# Patient Record
Sex: FEMALE | Race: ASIAN | NOT HISPANIC OR LATINO
[De-identification: names, ages, dates, MRNs, and addresses within clinical notes are randomized per-mention and may not be internally consistent; named-entity substitution may affect disease eponyms.]

---

## 2017-02-21 ENCOUNTER — RX RENEWAL (OUTPATIENT)
Age: 32
End: 2017-02-21

## 2017-03-03 ENCOUNTER — APPOINTMENT (OUTPATIENT)
Dept: ENDOCRINOLOGY | Facility: CLINIC | Age: 32
End: 2017-03-03

## 2017-03-03 VITALS
SYSTOLIC BLOOD PRESSURE: 110 MMHG | HEIGHT: 62 IN | DIASTOLIC BLOOD PRESSURE: 80 MMHG | BODY MASS INDEX: 28.52 KG/M2 | WEIGHT: 155 LBS

## 2017-03-06 LAB
ALBUMIN SERPL ELPH-MCNC: 4.6 G/DL
ALP BLD-CCNC: 62 U/L
ALT SERPL-CCNC: 20 U/L
ANION GAP SERPL CALC-SCNC: 14 MMOL/L
ANION GAP SERPL CALC-SCNC: 15 MMOL/L
AST SERPL-CCNC: 19 U/L
BILIRUB SERPL-MCNC: 0.2 MG/DL
BUN SERPL-MCNC: 17 MG/DL
BUN SERPL-MCNC: 18 MG/DL
CALCIUM SERPL-MCNC: 9.7 MG/DL
CALCIUM SERPL-MCNC: 9.9 MG/DL
CALCIUM SERPL-MCNC: 9.9 MG/DL
CHLORIDE SERPL-SCNC: 100 MMOL/L
CHLORIDE SERPL-SCNC: 102 MMOL/L
CO2 SERPL-SCNC: 23 MMOL/L
CO2 SERPL-SCNC: 24 MMOL/L
CREAT SERPL-MCNC: 0.85 MG/DL
CREAT SERPL-MCNC: 0.86 MG/DL
GLUCOSE SERPL-MCNC: 93 MG/DL
GLUCOSE SERPL-MCNC: 94 MG/DL
PARATHYROID HORMONE INTACT: 33 PG/ML
POTASSIUM SERPL-SCNC: 4.7 MMOL/L
POTASSIUM SERPL-SCNC: 4.9 MMOL/L
PROT SERPL-MCNC: 8 G/DL
SODIUM SERPL-SCNC: 138 MMOL/L
SODIUM SERPL-SCNC: 140 MMOL/L
T4 FREE SERPL-MCNC: 1.7 NG/DL
THYROGLOB AB SERPL-ACNC: <20 IU/ML
THYROGLOB SERPL-MCNC: <0.2 NG/ML
TSH SERPL-ACNC: 0.04 UIU/ML

## 2017-06-20 ENCOUNTER — APPOINTMENT (OUTPATIENT)
Dept: SURGERY | Facility: CLINIC | Age: 32
End: 2017-06-20

## 2017-06-20 ENCOUNTER — LABORATORY RESULT (OUTPATIENT)
Age: 32
End: 2017-06-20

## 2017-06-22 ENCOUNTER — OTHER (OUTPATIENT)
Age: 32
End: 2017-06-22

## 2017-06-22 LAB
T3 SERPL-MCNC: 96 NG/DL
T4 FREE SERPL-MCNC: 1.5 NG/DL
TSH SERPL-ACNC: 0.45 UIU/ML

## 2017-06-23 LAB
THYROGLOB AB SERPL-ACNC: <20 IU/ML
THYROGLOB SERPL-MCNC: <0.2 NG/ML

## 2017-07-14 ENCOUNTER — APPOINTMENT (OUTPATIENT)
Dept: DERMATOLOGY | Facility: CLINIC | Age: 32
End: 2017-07-14

## 2017-09-12 ENCOUNTER — FORM ENCOUNTER (OUTPATIENT)
Age: 32
End: 2017-09-12

## 2017-09-13 ENCOUNTER — APPOINTMENT (OUTPATIENT)
Dept: ULTRASOUND IMAGING | Facility: IMAGING CENTER | Age: 32
End: 2017-09-13
Payer: COMMERCIAL

## 2017-09-13 ENCOUNTER — OUTPATIENT (OUTPATIENT)
Dept: OUTPATIENT SERVICES | Facility: HOSPITAL | Age: 32
LOS: 1 days | End: 2017-09-13
Payer: COMMERCIAL

## 2017-09-13 DIAGNOSIS — Z98.89 OTHER SPECIFIED POSTPROCEDURAL STATES: Chronic | ICD-10-CM

## 2017-09-13 DIAGNOSIS — Z64.0 PROBLEMS RELATED TO UNWANTED PREGNANCY: Chronic | ICD-10-CM

## 2017-09-13 DIAGNOSIS — Z00.8 ENCOUNTER FOR OTHER GENERAL EXAMINATION: ICD-10-CM

## 2017-09-13 PROCEDURE — 76536 US EXAM OF HEAD AND NECK: CPT | Mod: 26

## 2017-09-13 PROCEDURE — 76536 US EXAM OF HEAD AND NECK: CPT

## 2017-09-21 ENCOUNTER — APPOINTMENT (OUTPATIENT)
Dept: ENDOCRINOLOGY | Facility: CLINIC | Age: 32
End: 2017-09-21
Payer: COMMERCIAL

## 2017-09-21 VITALS
DIASTOLIC BLOOD PRESSURE: 80 MMHG | OXYGEN SATURATION: 99 % | SYSTOLIC BLOOD PRESSURE: 120 MMHG | HEART RATE: 94 BPM | WEIGHT: 152 LBS | HEIGHT: 62 IN | BODY MASS INDEX: 27.97 KG/M2

## 2017-09-21 PROCEDURE — 99213 OFFICE O/P EST LOW 20 MIN: CPT

## 2017-09-25 LAB
HBA1C MFR BLD HPLC: 5.3 %
T4 FREE SERPL-MCNC: 1.7 NG/DL
THYROGLOB AB SERPL-ACNC: <20 IU/ML
THYROGLOB SERPL-MCNC: <0.2 NG/ML
TSH SERPL-ACNC: 0.38 UIU/ML

## 2017-12-26 ENCOUNTER — APPOINTMENT (OUTPATIENT)
Dept: DERMATOLOGY | Facility: CLINIC | Age: 32
End: 2017-12-26
Payer: COMMERCIAL

## 2017-12-26 VITALS
HEIGHT: 62 IN | SYSTOLIC BLOOD PRESSURE: 118 MMHG | WEIGHT: 155 LBS | DIASTOLIC BLOOD PRESSURE: 70 MMHG | BODY MASS INDEX: 28.52 KG/M2

## 2017-12-26 PROCEDURE — 99212 OFFICE O/P EST SF 10 MIN: CPT

## 2017-12-28 ENCOUNTER — APPOINTMENT (OUTPATIENT)
Dept: SURGERY | Facility: CLINIC | Age: 32
End: 2017-12-28
Payer: COMMERCIAL

## 2017-12-28 PROCEDURE — 99213 OFFICE O/P EST LOW 20 MIN: CPT

## 2018-02-17 ENCOUNTER — RX RENEWAL (OUTPATIENT)
Age: 33
End: 2018-02-17

## 2018-03-13 ENCOUNTER — APPOINTMENT (OUTPATIENT)
Dept: ENDOCRINOLOGY | Facility: CLINIC | Age: 33
End: 2018-03-13

## 2018-05-11 ENCOUNTER — APPOINTMENT (OUTPATIENT)
Dept: ENDOCRINOLOGY | Facility: CLINIC | Age: 33
End: 2018-05-11
Payer: COMMERCIAL

## 2018-05-11 VITALS
BODY MASS INDEX: 28.52 KG/M2 | SYSTOLIC BLOOD PRESSURE: 110 MMHG | HEIGHT: 62 IN | DIASTOLIC BLOOD PRESSURE: 70 MMHG | WEIGHT: 155 LBS

## 2018-05-11 LAB
HBA1C MFR BLD HPLC: 5.6 %
THYROGLOB AB SERPL-ACNC: <20 IU/ML
THYROGLOB SERPL-MCNC: <0.2 NG/ML

## 2018-05-11 PROCEDURE — 99213 OFFICE O/P EST LOW 20 MIN: CPT

## 2018-05-14 LAB
T4 FREE SERPL-MCNC: 1.9 NG/DL
TSH SERPL-ACNC: 0.19 UIU/ML

## 2018-06-26 ENCOUNTER — APPOINTMENT (OUTPATIENT)
Dept: SURGERY | Facility: CLINIC | Age: 33
End: 2018-06-26
Payer: COMMERCIAL

## 2018-06-26 PROCEDURE — 99213 OFFICE O/P EST LOW 20 MIN: CPT

## 2018-08-09 ENCOUNTER — LABORATORY RESULT (OUTPATIENT)
Age: 33
End: 2018-08-09

## 2018-08-09 ENCOUNTER — RESULT REVIEW (OUTPATIENT)
Age: 33
End: 2018-08-09

## 2018-08-09 ENCOUNTER — APPOINTMENT (OUTPATIENT)
Dept: SURGERY | Facility: CLINIC | Age: 33
End: 2018-08-09
Payer: COMMERCIAL

## 2018-08-09 PROCEDURE — 36415 COLL VENOUS BLD VENIPUNCTURE: CPT

## 2018-08-10 LAB
T3 SERPL-MCNC: 105 NG/DL
T4 FREE SERPL-MCNC: 1.5 NG/DL
THYROGLOB AB SERPL-ACNC: <20 IU/ML
THYROGLOB SERPL-MCNC: <0.2 NG/ML
TSH SERPL-ACNC: 1.15 UIU/ML

## 2018-08-14 ENCOUNTER — RESULT REVIEW (OUTPATIENT)
Age: 33
End: 2018-08-14

## 2018-08-23 ENCOUNTER — RX RENEWAL (OUTPATIENT)
Age: 33
End: 2018-08-23

## 2018-11-21 ENCOUNTER — RX RENEWAL (OUTPATIENT)
Age: 33
End: 2018-11-21

## 2018-12-18 ENCOUNTER — APPOINTMENT (OUTPATIENT)
Dept: SURGERY | Facility: CLINIC | Age: 33
End: 2018-12-18
Payer: COMMERCIAL

## 2018-12-18 PROCEDURE — 99213 OFFICE O/P EST LOW 20 MIN: CPT

## 2019-05-29 ENCOUNTER — APPOINTMENT (OUTPATIENT)
Dept: ENDOCRINOLOGY | Facility: CLINIC | Age: 34
End: 2019-05-29
Payer: COMMERCIAL

## 2019-05-29 VITALS
HEIGHT: 62 IN | DIASTOLIC BLOOD PRESSURE: 72 MMHG | SYSTOLIC BLOOD PRESSURE: 122 MMHG | HEART RATE: 89 BPM | WEIGHT: 155 LBS | BODY MASS INDEX: 28.52 KG/M2 | OXYGEN SATURATION: 98 %

## 2019-05-29 PROCEDURE — 99214 OFFICE O/P EST MOD 30 MIN: CPT

## 2019-05-29 RX ORDER — CLINDAMYCIN PHOSPHATE 10 MG/ML
1 LOTION TOPICAL DAILY
Qty: 1 | Refills: 2 | Status: DISCONTINUED | COMMUNITY
Start: 2017-12-26 | End: 2019-05-29

## 2019-05-29 RX ORDER — FLUTICASONE PROPIONATE 50 UG/1
50 SPRAY, METERED NASAL
Qty: 16 | Refills: 0 | Status: DISCONTINUED | COMMUNITY
Start: 2017-05-01 | End: 2019-05-29

## 2019-05-29 RX ORDER — TRETINOIN 0.5 MG/G
0.05 CREAM TOPICAL
Qty: 45 | Refills: 1 | Status: DISCONTINUED | COMMUNITY
Start: 2017-12-26 | End: 2019-05-29

## 2019-06-07 ENCOUNTER — APPOINTMENT (OUTPATIENT)
Dept: ENDOCRINOLOGY | Facility: CLINIC | Age: 34
End: 2019-06-07

## 2019-06-23 ENCOUNTER — FORM ENCOUNTER (OUTPATIENT)
Age: 34
End: 2019-06-23

## 2019-06-24 ENCOUNTER — APPOINTMENT (OUTPATIENT)
Dept: ULTRASOUND IMAGING | Facility: IMAGING CENTER | Age: 34
End: 2019-06-24
Payer: COMMERCIAL

## 2019-06-24 ENCOUNTER — OUTPATIENT (OUTPATIENT)
Dept: OUTPATIENT SERVICES | Facility: HOSPITAL | Age: 34
LOS: 1 days | End: 2019-06-24
Payer: COMMERCIAL

## 2019-06-24 DIAGNOSIS — Z98.89 OTHER SPECIFIED POSTPROCEDURAL STATES: Chronic | ICD-10-CM

## 2019-06-24 DIAGNOSIS — C73 MALIGNANT NEOPLASM OF THYROID GLAND: ICD-10-CM

## 2019-06-24 DIAGNOSIS — Z64.0 PROBLEMS RELATED TO UNWANTED PREGNANCY: Chronic | ICD-10-CM

## 2019-06-24 LAB
25(OH)D3 SERPL-MCNC: 26.7 NG/ML
ANION GAP SERPL CALC-SCNC: 12 MMOL/L
BUN SERPL-MCNC: 15 MG/DL
CALCIUM SERPL-MCNC: 9.6 MG/DL
CALCIUM SERPL-MCNC: 9.6 MG/DL
CHLORIDE SERPL-SCNC: 102 MMOL/L
CO2 SERPL-SCNC: 24 MMOL/L
CREAT SERPL-MCNC: 0.89 MG/DL
ESTIMATED AVERAGE GLUCOSE: 103 MG/DL
GLUCOSE SERPL-MCNC: 94 MG/DL
HBA1C MFR BLD HPLC: 5.2 %
MAGNESIUM SERPL-MCNC: 2.1 MG/DL
PARATHYROID HORMONE INTACT: 38 PG/ML
PHOSPHATE SERPL-MCNC: 3.5 MG/DL
POTASSIUM SERPL-SCNC: 4.5 MMOL/L
SODIUM SERPL-SCNC: 138 MMOL/L
T4 FREE SERPL-MCNC: 1.8 NG/DL
THYROGLOB AB SERPL-ACNC: <20 IU/ML
THYROGLOB SERPL-MCNC: <0.2 NG/ML
TSH SERPL-ACNC: 1.57 UIU/ML

## 2019-06-24 PROCEDURE — 76536 US EXAM OF HEAD AND NECK: CPT

## 2019-06-24 PROCEDURE — 76536 US EXAM OF HEAD AND NECK: CPT | Mod: 26

## 2019-06-25 ENCOUNTER — APPOINTMENT (OUTPATIENT)
Dept: SURGERY | Facility: CLINIC | Age: 34
End: 2019-06-25
Payer: COMMERCIAL

## 2019-06-25 PROCEDURE — 99213 OFFICE O/P EST LOW 20 MIN: CPT

## 2019-06-25 NOTE — HISTORY OF PRESENT ILLNESS
[de-identified] : 4  1/2   years s/p total thyroidectomy for malignancy. feels well on Synthroid 125 mcg daily. denies dysphagia, hoarseness or new lesions. no changes medically since last visit

## 2019-06-25 NOTE — PHYSICAL EXAM
[de-identified] : well healed scar [de-identified] : no palpable thyroid bed nodules [Laryngoscopy Performed] : laryngoscopy was performed, see procedure section for findings [Midline] : located in midline position [Normal] : orientation to person, place, and time: normal

## 2019-08-27 ENCOUNTER — RX RENEWAL (OUTPATIENT)
Age: 34
End: 2019-08-27

## 2019-12-05 ENCOUNTER — APPOINTMENT (OUTPATIENT)
Dept: ENDOCRINOLOGY | Facility: CLINIC | Age: 34
End: 2019-12-05
Payer: COMMERCIAL

## 2019-12-05 VITALS
BODY MASS INDEX: 26.87 KG/M2 | HEART RATE: 86 BPM | HEIGHT: 62 IN | OXYGEN SATURATION: 99 % | DIASTOLIC BLOOD PRESSURE: 80 MMHG | WEIGHT: 146 LBS | SYSTOLIC BLOOD PRESSURE: 120 MMHG

## 2019-12-05 PROCEDURE — 99214 OFFICE O/P EST MOD 30 MIN: CPT

## 2019-12-05 NOTE — PHYSICAL EXAM
[EOMI] : extra ocular movement intact [No Neck Mass] : no neck mass was observed [Supple] : the neck was supple [No LAD] : no lymphadenopathy [Well Healed Scar] : well healed scar [No Respiratory Distress] : no respiratory distress [Clear to Auscultation] : lungs were clear to auscultation bilaterally [No Accessory Muscle Use] : no accessory muscle use [Normal Rate and Effort] : normal respiratory rhythm and effort [Normal Rate] : heart rate was normal  [Normal S1, S2] : normal S1 and S2 [Not Tender] : non-tender [Soft] : abdomen soft [Normal Gait] : normal gait [No Motor Deficits] : the motor exam was normal [No Tremors] : no tremors [Oriented x3] : oriented to person, place, and time

## 2019-12-05 NOTE — HISTORY OF PRESENT ILLNESS
[FreeTextEntry1] : 34 year old female here for a new pt visit with me \par used to follow with Dr. 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 She was treated with surgery Dr. Vuong. Her whole body scan done in June 2016 showed 0 percent uptake and TG was 0.2\par  She has been taking Synthroid 125 mcg every day \par had negative neck sonogram in sept 2017"\par \par \par thyroid cancer hx \par Synthroid 125mcg \par empty stomach \par trying to loose weight-!!\par 10lbs \par no cold or hot intolerance \par +energy is good \par once in a while palpitations, possibly related to caffiene  \par 7 tabs a week \par has a PCP\par \par \par \par no numbress or tingling daily- maybe once a month \par \par \par PSH\par total thyroidectomy \par \par \par

## 2019-12-05 NOTE — REVIEW OF SYSTEMS
[Palpitations] : palpitations [Negative] : Genitourinary [FreeTextEntry5] : palpitations once in a while

## 2019-12-05 NOTE — ASSESSMENT
[FreeTextEntry1] : 34 year old female here for a new pt visit with me \par used to follow with Dr. 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 She was treated with surgery Dr. Vuong. Her whole body scan done in June 2016 showed 0 percent uptake and TG was 0.2\par  She has been taking Synthroid 125 mcg every day \par \par \par her TG antibody and Tg have been negative recenlty last year \par we will get thyroid cancer markers and thyroid US as well  to assess risk assessment

## 2019-12-06 LAB
25(OH)D3 SERPL-MCNC: 33.1 NG/ML
ALBUMIN SERPL ELPH-MCNC: 4.7 G/DL
ALP BLD-CCNC: 115 U/L
ALT SERPL-CCNC: 63 U/L
ANION GAP SERPL CALC-SCNC: 15 MMOL/L
AST SERPL-CCNC: 22 U/L
BILIRUB SERPL-MCNC: <0.2 MG/DL
BUN SERPL-MCNC: 17 MG/DL
CALCIUM SERPL-MCNC: 9.9 MG/DL
CHLORIDE SERPL-SCNC: 101 MMOL/L
CHOLEST SERPL-MCNC: 234 MG/DL
CHOLEST/HDLC SERPL: 7.1 RATIO
CO2 SERPL-SCNC: 24 MMOL/L
CREAT SERPL-MCNC: 1.25 MG/DL
ESTIMATED AVERAGE GLUCOSE: 105 MG/DL
GLUCOSE SERPL-MCNC: 88 MG/DL
HBA1C MFR BLD HPLC: 5.3 %
HDLC SERPL-MCNC: 33 MG/DL
LDLC SERPL CALC-MCNC: 123 MG/DL
POTASSIUM SERPL-SCNC: 4.6 MMOL/L
PROT SERPL-MCNC: 7.8 G/DL
SODIUM SERPL-SCNC: 140 MMOL/L
T4 FREE SERPL-MCNC: 1.6 NG/DL
THYROGLOB AB SERPL-ACNC: <20 IU/ML
THYROGLOB SERPL-MCNC: <0.2 NG/ML
TRIGL SERPL-MCNC: 390 MG/DL
TSH SERPL-ACNC: 0.32 UIU/ML
VIT B12 SERPL-MCNC: 900 PG/ML

## 2020-06-08 ENCOUNTER — LABORATORY RESULT (OUTPATIENT)
Age: 35
End: 2020-06-08

## 2020-06-08 ENCOUNTER — APPOINTMENT (OUTPATIENT)
Dept: ENDOCRINOLOGY | Facility: CLINIC | Age: 35
End: 2020-06-08
Payer: COMMERCIAL

## 2020-06-08 VITALS
OXYGEN SATURATION: 98 % | BODY MASS INDEX: 27.6 KG/M2 | TEMPERATURE: 98.1 F | WEIGHT: 150 LBS | DIASTOLIC BLOOD PRESSURE: 74 MMHG | HEIGHT: 62 IN | HEART RATE: 70 BPM | SYSTOLIC BLOOD PRESSURE: 120 MMHG

## 2020-06-08 PROCEDURE — 99215 OFFICE O/P EST HI 40 MIN: CPT

## 2020-06-08 NOTE — PHYSICAL EXAM
[Well Nourished] : well nourished [EOMI] : extra ocular movement intact [Normal Hearing] : hearing was normal [No Neck Mass] : no neck mass was observed [Thyroid Not Enlarged] : the thyroid was not enlarged [No Thyroid Nodules] : no palpable thyroid nodules [No Respiratory Distress] : no respiratory distress [No Accessory Muscle Use] : no accessory muscle use [Clear to Auscultation] : lungs were clear to auscultation bilaterally [Normal to Percussion] : lungs were normal to percussion [Normal S1, S2] : normal S1 and S2 [Normal Rate] : heart rate was normal [Regular Rhythm] : with a regular rhythm [Normal Bowel Sounds] : normal bowel sounds [Not Tender] : non-tender [Soft] : abdomen soft [No HSM] : no hepato-splenomegaly [Normal Gait] : normal gait [No Joint Swelling] : no joint swelling seen [Oriented x3] : oriented to person, place, and time [Normal Affect] : the affect was normal [Normal Insight/Judgement] : insight and judgment were intact [Normal Mood] : the mood was normal

## 2020-06-08 NOTE — HISTORY OF PRESENT ILLNESS
[FreeTextEntry1] : 35 year old female here for a followup  pt visit with me \par used to follow with Dr. 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 She was treated with surgery Dr. Vuong. Her whole body scan done in June 2016 showed 0 percent uptake and TG was 0.2\par  She has been taking Synthroid 125 mcg every day \par had negative neck sonogram in sept 2017"\par \par \par thyroid cancer hx \par Synthroid 125mcg \par empty stomach \par no cold or hot intolerance \par +energy is good \par + no palptations \par 7 tabs a week \par at the last labs- she was supposed to have LFTS repeated and lipid panel - this has not been repeated or followed up with pcp \par was sick in feb. 2020\par  \par \par Dec 2019\par TG <.20\par TG antibodies <20 \par per trend they have been negative since august 2018 \par \par \par thyroid US \par June 2019\par INTERPRETATION: CLINICAL INFORMATION: 34-year-old female status post total thyroid\par cancer \par COMPARISON: Ultrasound 09/13/2017 TECHNIQUE: Sonography of the thyroid. \par FINDINGS: \par The thyroid beds are unremarkable. \par Cervical Lymph Nodes: No enlarged or abnormal morphology cervical nodes. \par IMPRESSION: \par No evidence local recurrence or metastatic disease\par \par \par PSH\par total thyroidectomy \par \par \par

## 2020-06-08 NOTE — ASSESSMENT
[FreeTextEntry1] : 35 year old female here for a followup pt visit with me \par used to follow with Dr. 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 She was treated with surgery Dr. Vuong. Her whole body scan done in June 2016 showed 0 percent uptake and TG was 0.2\par  She has been taking Synthroid 125 mcg every day \par \par \par her TG antibody and Tg have been negative, last checked dec 2019\par we will get thyroid cancer markers and thyroid US as well  to assess risk assessment \par \par \par 1. thyroid cancer\par get TG levels and thyroid US \par pt takes levoT correctly 125 daily \par will aim TSH based on labs and US \par \par 2. HLD\par pt with HLD in past \par was referred to Dr. Glaser, did not see as of yet\par check levels today, if elevated will refer to help assess if underlaying genetic etiology \par \par \par 3. preDM\par preDM in past check a1c today \par \par \par 4. HCM \par check Vitamin D levels\par discussed healthy lifestyle which incorporates healthy diet and exercise \par \par \par \par  [Levothyroxine] : The patient was instructed to take Levothyroxine on an empty stomach, separate from vitamins, and wait at least 30 minutes before eating

## 2020-06-18 LAB
25(OH)D3 SERPL-MCNC: 41.9 NG/ML
ALBUMIN SERPL ELPH-MCNC: 4.5 G/DL
ALP BLD-CCNC: 66 U/L
ALT SERPL-CCNC: 30 U/L
ANION GAP SERPL CALC-SCNC: 13 MMOL/L
AST SERPL-CCNC: 24 U/L
BILIRUB SERPL-MCNC: <0.2 MG/DL
BUN SERPL-MCNC: 10 MG/DL
CALCIUM SERPL-MCNC: 9.8 MG/DL
CHLORIDE SERPL-SCNC: 104 MMOL/L
CHOLEST SERPL-MCNC: 206 MG/DL
CHOLEST/HDLC SERPL: 5.7 RATIO
CO2 SERPL-SCNC: 22 MMOL/L
CREAT SERPL-MCNC: 0.86 MG/DL
ESTIMATED AVERAGE GLUCOSE: 108 MG/DL
GLUCOSE SERPL-MCNC: 99 MG/DL
HBA1C MFR BLD HPLC: 5.4 %
HDLC SERPL-MCNC: 36 MG/DL
LDLC SERPL CALC-MCNC: 120 MG/DL
POTASSIUM SERPL-SCNC: 4.9 MMOL/L
PROT SERPL-MCNC: 7.5 G/DL
SODIUM SERPL-SCNC: 138 MMOL/L
THYROGLOB AB SERPL-ACNC: <20 IU/ML
THYROGLOB SERPL-MCNC: <0.2 NG/ML
TRIGL SERPL-MCNC: 248 MG/DL
TSH SERPL-ACNC: 0.09 UIU/ML

## 2020-06-30 ENCOUNTER — OUTPATIENT (OUTPATIENT)
Dept: OUTPATIENT SERVICES | Facility: HOSPITAL | Age: 35
LOS: 1 days | End: 2020-06-30
Payer: COMMERCIAL

## 2020-06-30 ENCOUNTER — APPOINTMENT (OUTPATIENT)
Dept: ULTRASOUND IMAGING | Facility: IMAGING CENTER | Age: 35
End: 2020-06-30
Payer: COMMERCIAL

## 2020-06-30 DIAGNOSIS — Z98.89 OTHER SPECIFIED POSTPROCEDURAL STATES: Chronic | ICD-10-CM

## 2020-06-30 DIAGNOSIS — C73 MALIGNANT NEOPLASM OF THYROID GLAND: ICD-10-CM

## 2020-06-30 DIAGNOSIS — Z64.0 PROBLEMS RELATED TO UNWANTED PREGNANCY: Chronic | ICD-10-CM

## 2020-06-30 PROCEDURE — 76536 US EXAM OF HEAD AND NECK: CPT | Mod: 26

## 2020-06-30 PROCEDURE — 76536 US EXAM OF HEAD AND NECK: CPT

## 2020-08-25 ENCOUNTER — RX RENEWAL (OUTPATIENT)
Age: 35
End: 2020-08-25

## 2020-10-22 LAB — TSH SERPL-ACNC: 0.92 UIU/ML

## 2020-10-27 ENCOUNTER — NON-APPOINTMENT (OUTPATIENT)
Age: 35
End: 2020-10-27

## 2020-10-30 ENCOUNTER — LABORATORY RESULT (OUTPATIENT)
Age: 35
End: 2020-10-30

## 2020-10-30 ENCOUNTER — NON-APPOINTMENT (OUTPATIENT)
Age: 35
End: 2020-10-30

## 2020-10-30 ENCOUNTER — APPOINTMENT (OUTPATIENT)
Dept: CARDIOLOGY | Facility: CLINIC | Age: 35
End: 2020-10-30
Payer: COMMERCIAL

## 2020-10-30 VITALS
RESPIRATION RATE: 16 BRPM | OXYGEN SATURATION: 100 % | BODY MASS INDEX: 27.6 KG/M2 | DIASTOLIC BLOOD PRESSURE: 78 MMHG | HEIGHT: 62 IN | SYSTOLIC BLOOD PRESSURE: 118 MMHG | WEIGHT: 150 LBS | HEART RATE: 78 BPM

## 2020-10-30 VITALS
HEART RATE: 74 BPM | RESPIRATION RATE: 15 BRPM | SYSTOLIC BLOOD PRESSURE: 118 MMHG | WEIGHT: 150 LBS | BODY MASS INDEX: 27.6 KG/M2 | DIASTOLIC BLOOD PRESSURE: 79 MMHG | HEIGHT: 62 IN

## 2020-10-30 PROCEDURE — 99203 OFFICE O/P NEW LOW 30 MIN: CPT

## 2020-10-30 PROCEDURE — 99072 ADDL SUPL MATRL&STAF TM PHE: CPT

## 2020-11-09 ENCOUNTER — APPOINTMENT (OUTPATIENT)
Dept: CARDIOLOGY | Facility: CLINIC | Age: 35
End: 2020-11-09

## 2020-12-10 ENCOUNTER — APPOINTMENT (OUTPATIENT)
Dept: ENDOCRINOLOGY | Facility: CLINIC | Age: 35
End: 2020-12-10
Payer: COMMERCIAL

## 2020-12-10 VITALS
DIASTOLIC BLOOD PRESSURE: 80 MMHG | HEIGHT: 62 IN | OXYGEN SATURATION: 98 % | SYSTOLIC BLOOD PRESSURE: 126 MMHG | WEIGHT: 151 LBS | BODY MASS INDEX: 27.79 KG/M2 | HEART RATE: 100 BPM | TEMPERATURE: 97.8 F

## 2020-12-10 PROCEDURE — 99214 OFFICE O/P EST MOD 30 MIN: CPT

## 2020-12-10 PROCEDURE — 99072 ADDL SUPL MATRL&STAF TM PHE: CPT

## 2020-12-28 LAB
25(OH)D3 SERPL-MCNC: 26.5 NG/ML
CHOLEST SERPL-MCNC: 223 MG/DL
ESTIMATED AVERAGE GLUCOSE: 105 MG/DL
HBA1C MFR BLD HPLC: 5.3 %
HDLC SERPL-MCNC: 34 MG/DL
LDLC SERPL CALC-MCNC: 115 MG/DL
NONHDLC SERPL-MCNC: 188 MG/DL
THYROGLOB AB SERPL-ACNC: <20 IU/ML
THYROGLOB SERPL-MCNC: <0.2 NG/ML
TRIGL SERPL-MCNC: 367 MG/DL
TSH SERPL-ACNC: 0.39 UIU/ML

## 2021-01-19 ENCOUNTER — APPOINTMENT (OUTPATIENT)
Dept: SURGERY | Facility: CLINIC | Age: 36
End: 2021-01-19

## 2021-02-24 ENCOUNTER — TRANSCRIPTION ENCOUNTER (OUTPATIENT)
Age: 36
End: 2021-02-24

## 2021-04-20 ENCOUNTER — LABORATORY RESULT (OUTPATIENT)
Age: 36
End: 2021-04-20

## 2021-04-20 ENCOUNTER — APPOINTMENT (OUTPATIENT)
Dept: SURGERY | Facility: CLINIC | Age: 36
End: 2021-04-20
Payer: COMMERCIAL

## 2021-04-20 PROCEDURE — 99072 ADDL SUPL MATRL&STAF TM PHE: CPT

## 2021-04-20 PROCEDURE — 36415 COLL VENOUS BLD VENIPUNCTURE: CPT

## 2021-04-20 PROCEDURE — 99214 OFFICE O/P EST MOD 30 MIN: CPT | Mod: 25

## 2021-04-20 NOTE — PHYSICAL EXAM
[de-identified] : well healed scar [de-identified] : no palpable thyroid bed nodules [Laryngoscopy Performed] : laryngoscopy was performed, see procedure section for findings [Midline] : located in midline position [Normal] : orientation to person, place, and time: normal

## 2021-04-20 NOTE — ASSESSMENT
[FreeTextEntry1] : will observe. blood  tests performed. to call next week for results. discussed may need to increase Synthroid or add Cytomel.      to return earlier if any change.  patient has been given the opportunity to ask questions, and all of the patient's questions have been answered to their satisfaction

## 2021-04-20 NOTE — HISTORY OF PRESENT ILLNESS
[de-identified] : 6 1/2   years s/p total thyroidectomy for malignancy. feels fatigued on Synthroid 125 mcg 6 1/2 doses weekly. denies dysphagia, hoarseness or new lesions. no changes medically since last visit. last sonogram ALFIE.   I have reviewed all old and new data and available images.  \par

## 2021-04-21 LAB
24R-OH-CALCIDIOL SERPL-MCNC: 43.8 PG/ML
CALCIUM SERPL-MCNC: 9.5 MG/DL
CALCIUM SERPL-MCNC: 9.5 MG/DL
PARATHYROID HORMONE INTACT: 46 PG/ML
T3 SERPL-MCNC: 80 NG/DL
T4 FREE SERPL-MCNC: 1.5 NG/DL
THYROGLOB AB SERPL-ACNC: <20 IU/ML
THYROGLOB SERPL-MCNC: <0.2 NG/ML
TSH SERPL-ACNC: 0.12 UIU/ML

## 2021-04-23 ENCOUNTER — NON-APPOINTMENT (OUTPATIENT)
Age: 36
End: 2021-04-23

## 2021-06-14 ENCOUNTER — APPOINTMENT (OUTPATIENT)
Dept: ENDOCRINOLOGY | Facility: CLINIC | Age: 36
End: 2021-06-14
Payer: COMMERCIAL

## 2021-06-14 VITALS — DIASTOLIC BLOOD PRESSURE: 82 MMHG | SYSTOLIC BLOOD PRESSURE: 118 MMHG | BODY MASS INDEX: 27.63 KG/M2 | WEIGHT: 156 LBS

## 2021-06-14 VITALS — HEART RATE: 85 BPM | TEMPERATURE: 98.3 F | HEIGHT: 63 IN | OXYGEN SATURATION: 98 %

## 2021-06-14 PROCEDURE — 99214 OFFICE O/P EST MOD 30 MIN: CPT

## 2021-06-14 PROCEDURE — 99072 ADDL SUPL MATRL&STAF TM PHE: CPT

## 2021-06-18 LAB
CHOLEST SERPL-MCNC: 170 MG/DL
HDLC SERPL-MCNC: 29 MG/DL
LDLC SERPL CALC-MCNC: 78 MG/DL
NONHDLC SERPL-MCNC: 141 MG/DL
T3 SERPL-MCNC: 103 NG/DL
T4 FREE SERPL-MCNC: 1.5 NG/DL
TRIGL SERPL-MCNC: 312 MG/DL
TSH SERPL-ACNC: 0.13 UIU/ML

## 2021-06-21 ENCOUNTER — NON-APPOINTMENT (OUTPATIENT)
Age: 36
End: 2021-06-21

## 2021-06-23 ENCOUNTER — NON-APPOINTMENT (OUTPATIENT)
Age: 36
End: 2021-06-23

## 2021-06-24 ENCOUNTER — NON-APPOINTMENT (OUTPATIENT)
Age: 36
End: 2021-06-24

## 2021-07-26 RX ORDER — LEVOTHYROXINE SODIUM 0.09 MG/1
88 TABLET ORAL DAILY
Qty: 30 | Refills: 1 | Status: DISCONTINUED | COMMUNITY
Start: 2021-06-18 | End: 2021-07-26

## 2021-10-05 ENCOUNTER — APPOINTMENT (OUTPATIENT)
Dept: ULTRASOUND IMAGING | Facility: IMAGING CENTER | Age: 36
End: 2021-10-05
Payer: COMMERCIAL

## 2021-10-05 ENCOUNTER — OUTPATIENT (OUTPATIENT)
Dept: OUTPATIENT SERVICES | Facility: HOSPITAL | Age: 36
LOS: 1 days | End: 2021-10-05
Payer: COMMERCIAL

## 2021-10-05 DIAGNOSIS — Z00.8 ENCOUNTER FOR OTHER GENERAL EXAMINATION: ICD-10-CM

## 2021-10-05 DIAGNOSIS — Z98.89 OTHER SPECIFIED POSTPROCEDURAL STATES: Chronic | ICD-10-CM

## 2021-10-05 DIAGNOSIS — C73 MALIGNANT NEOPLASM OF THYROID GLAND: ICD-10-CM

## 2021-10-05 DIAGNOSIS — Z64.0 PROBLEMS RELATED TO UNWANTED PREGNANCY: Chronic | ICD-10-CM

## 2021-10-05 PROCEDURE — 76536 US EXAM OF HEAD AND NECK: CPT

## 2021-10-05 PROCEDURE — 76536 US EXAM OF HEAD AND NECK: CPT | Mod: 26

## 2021-10-23 ENCOUNTER — NON-APPOINTMENT (OUTPATIENT)
Age: 36
End: 2021-10-23

## 2021-11-29 ENCOUNTER — RX RENEWAL (OUTPATIENT)
Age: 36
End: 2021-11-29

## 2021-12-07 ENCOUNTER — LABORATORY RESULT (OUTPATIENT)
Age: 36
End: 2021-12-07

## 2021-12-07 ENCOUNTER — APPOINTMENT (OUTPATIENT)
Dept: ENDOCRINOLOGY | Facility: CLINIC | Age: 36
End: 2021-12-07
Payer: COMMERCIAL

## 2021-12-07 VITALS
WEIGHT: 158 LBS | BODY MASS INDEX: 28 KG/M2 | HEIGHT: 63 IN | SYSTOLIC BLOOD PRESSURE: 118 MMHG | TEMPERATURE: 97.5 F | DIASTOLIC BLOOD PRESSURE: 76 MMHG | OXYGEN SATURATION: 99 % | HEART RATE: 105 BPM

## 2021-12-07 PROCEDURE — 99214 OFFICE O/P EST MOD 30 MIN: CPT

## 2021-12-10 RX ORDER — ERGOCALCIFEROL 1.25 MG/1
1.25 MG CAPSULE, LIQUID FILLED ORAL
Qty: 8 | Refills: 0 | Status: ACTIVE | COMMUNITY
Start: 2021-12-10 | End: 1900-01-01

## 2021-12-17 LAB
25(OH)D3 SERPL-MCNC: 20 NG/ML
ALBUMIN SERPL ELPH-MCNC: 4.6 G/DL
ALP BLD-CCNC: 67 U/L
ALT SERPL-CCNC: 17 U/L
ANION GAP SERPL CALC-SCNC: 11 MMOL/L
AST SERPL-CCNC: 17 U/L
BILIRUB SERPL-MCNC: <0.2 MG/DL
BUN SERPL-MCNC: 12 MG/DL
CALCIUM SERPL-MCNC: 9.7 MG/DL
CHLORIDE SERPL-SCNC: 101 MMOL/L
CHOLEST SERPL-MCNC: 218 MG/DL
CO2 SERPL-SCNC: 25 MMOL/L
CREAT SERPL-MCNC: 1.02 MG/DL
ESTIMATED AVERAGE GLUCOSE: 103 MG/DL
HBA1C MFR BLD HPLC: 5.2 %
HDLC SERPL-MCNC: 28 MG/DL
LDLC SERPL CALC-MCNC: NORMAL MG/DL
NONHDLC SERPL-MCNC: 190 MG/DL
POTASSIUM SERPL-SCNC: 4.4 MMOL/L
PROT SERPL-MCNC: 7.8 G/DL
SODIUM SERPL-SCNC: 137 MMOL/L
THYROGLOB AB SERPL-ACNC: <20 IU/ML
THYROGLOB SERPL-MCNC: <0.2 NG/ML
TRIGL SERPL-MCNC: 707 MG/DL
TSH SERPL-ACNC: 12.8 UIU/ML

## 2022-01-11 ENCOUNTER — APPOINTMENT (OUTPATIENT)
Dept: ULTRASOUND IMAGING | Facility: IMAGING CENTER | Age: 37
End: 2022-01-11
Payer: COMMERCIAL

## 2022-01-11 ENCOUNTER — OUTPATIENT (OUTPATIENT)
Dept: OUTPATIENT SERVICES | Facility: HOSPITAL | Age: 37
LOS: 1 days | End: 2022-01-11
Payer: COMMERCIAL

## 2022-01-11 DIAGNOSIS — Z64.0 PROBLEMS RELATED TO UNWANTED PREGNANCY: Chronic | ICD-10-CM

## 2022-01-11 DIAGNOSIS — Z00.8 ENCOUNTER FOR OTHER GENERAL EXAMINATION: ICD-10-CM

## 2022-01-11 DIAGNOSIS — Z98.89 OTHER SPECIFIED POSTPROCEDURAL STATES: Chronic | ICD-10-CM

## 2022-01-11 DIAGNOSIS — C73 MALIGNANT NEOPLASM OF THYROID GLAND: ICD-10-CM

## 2022-01-11 PROCEDURE — 76536 US EXAM OF HEAD AND NECK: CPT

## 2022-01-11 PROCEDURE — 76536 US EXAM OF HEAD AND NECK: CPT | Mod: 26

## 2022-01-27 ENCOUNTER — LABORATORY RESULT (OUTPATIENT)
Age: 37
End: 2022-01-27

## 2022-01-27 ENCOUNTER — APPOINTMENT (OUTPATIENT)
Dept: SURGERY | Facility: CLINIC | Age: 37
End: 2022-01-27
Payer: COMMERCIAL

## 2022-01-27 PROCEDURE — 99214 OFFICE O/P EST MOD 30 MIN: CPT

## 2022-01-27 PROCEDURE — 36415 COLL VENOUS BLD VENIPUNCTURE: CPT

## 2022-01-27 NOTE — PHYSICAL EXAM
[de-identified] : well healed scar [de-identified] : no palpable thyroid bed nodules [Laryngoscopy Performed] : laryngoscopy was performed, see procedure section for findings [Midline] : located in midline position [Normal] : orientation to person, place, and time: normal

## 2022-01-27 NOTE — HISTORY OF PRESENT ILLNESS
[de-identified] : 7  years s/p total thyroidectomy for malignancy. feels fatigued on Synthroid 125 mcg 6 1/2 doses weekly. denies dysphagia, hoarseness or new lesions. no changes medically since last visit. recent sonogram ALFIE with benign appearing nodes.  TG low.  returns earlier than scheduled due to adenopathy.  history of sinus infections.   I have reviewed all old and new data and available images.  \par

## 2022-01-27 NOTE — ASSESSMENT
[FreeTextEntry1] : will observe. blood  tests performed. to call next week for results. suspect nodes reactive and with low TSH and appearance, do not require FNA.  sonogram next visit. asked to be evaluated by ENT.  names given     to return earlier if any change.  patient has been given the opportunity to ask questions, and all of the patient's questions have been answered to their satisfaction.  d/w dr monaco

## 2022-01-28 LAB
BASOPHILS # BLD AUTO: 0.06 K/UL
BASOPHILS NFR BLD AUTO: 0.8 %
CHOLEST SERPL-MCNC: 244 MG/DL
EOSINOPHIL # BLD AUTO: 0.22 K/UL
EOSINOPHIL NFR BLD AUTO: 2.9 %
ESTIMATED AVERAGE GLUCOSE: 108 MG/DL
HBA1C MFR BLD HPLC: 5.4 %
HCT VFR BLD CALC: 38.1 %
HDLC SERPL-MCNC: 41 MG/DL
HGB BLD-MCNC: 12.3 G/DL
IMM GRANULOCYTES NFR BLD AUTO: 0.4 %
LDLC SERPL CALC-MCNC: 149 MG/DL
LYMPHOCYTES # BLD AUTO: 2.12 K/UL
LYMPHOCYTES NFR BLD AUTO: 27.5 %
MAN DIFF?: NORMAL
MCHC RBC-ENTMCNC: 27 PG
MCHC RBC-ENTMCNC: 32.3 GM/DL
MCV RBC AUTO: 83.6 FL
MONOCYTES # BLD AUTO: 0.5 K/UL
MONOCYTES NFR BLD AUTO: 6.5 %
NEUTROPHILS # BLD AUTO: 4.77 K/UL
NEUTROPHILS NFR BLD AUTO: 61.9 %
NONHDLC SERPL-MCNC: 203 MG/DL
PLATELET # BLD AUTO: 466 K/UL
RBC # BLD: 4.56 M/UL
RBC # FLD: 15.1 %
T3 SERPL-MCNC: 120 NG/DL
T4 FREE SERPL-MCNC: 1.8 NG/DL
TRIGL SERPL-MCNC: 269 MG/DL
TSH SERPL-ACNC: 0.49 UIU/ML
WBC # FLD AUTO: 7.7 K/UL

## 2022-01-29 LAB
THYROGLOB AB SERPL-ACNC: <20 IU/ML
THYROGLOB SERPL-MCNC: <0.2 NG/ML

## 2022-01-31 ENCOUNTER — NON-APPOINTMENT (OUTPATIENT)
Age: 37
End: 2022-01-31

## 2022-02-28 ENCOUNTER — APPOINTMENT (OUTPATIENT)
Dept: OTOLARYNGOLOGY | Facility: CLINIC | Age: 37
End: 2022-02-28
Payer: COMMERCIAL

## 2022-02-28 VITALS
WEIGHT: 156 LBS | SYSTOLIC BLOOD PRESSURE: 119 MMHG | HEART RATE: 86 BPM | BODY MASS INDEX: 27.64 KG/M2 | DIASTOLIC BLOOD PRESSURE: 81 MMHG | HEIGHT: 63 IN | TEMPERATURE: 97 F

## 2022-02-28 PROCEDURE — 99204 OFFICE O/P NEW MOD 45 MIN: CPT | Mod: 25

## 2022-02-28 PROCEDURE — 31231 NASAL ENDOSCOPY DX: CPT

## 2022-02-28 NOTE — REVIEW OF SYSTEMS
[Seasonal Allergies] : seasonal allergies [Post Nasal Drip] : post nasal drip [Nasal Congestion] : nasal congestion [Recurrent Sinus Infections] : recurrent sinus infections [Sinus Pain] : sinus pain [Sinus Pressure] : sinus pressure [Cough] : cough [Negative] : Heme/Lymph

## 2022-02-28 NOTE — HISTORY OF PRESENT ILLNESS
[de-identified] : PAtient had thyroid cancer with total thyroidectomy in 2015 (With Dr Vuong). This past October she had a lymph node that was questionable and she had a sonogram in october and january confirming an enlarged node. SHe is thinking that her reactive lymph node could be due to a chronic sinus infection. \par SHe has a history of polyps in the sinuses and she currently feels like she has a sinus infection. She has constant coughing and a history of reflux. SHe is taking omeprazole. She has postnasal drip that contributes to the coughing. The last time she was treated for a sinus infection with antibitoics was in 2020. She states that the cough used to be dry but now is productive. SHe has nasal congestion all the time as well and has issues breathing through both sides of the nose. SHe has seasonal allergies to trees. She has used nasal sprays in the past as well but she cannot tolerate them as she gets very dry nasally

## 2022-02-28 NOTE — REASON FOR VISIT
[Initial Evaluation] : an initial evaluation for [FreeTextEntry2] : nasal congsetion and postnasal drip

## 2022-02-28 NOTE — ASSESSMENT
[FreeTextEntry1] : 37-year-old female history of postnasal drip what seems to be chronic sinus issues been seen by another ENT told had a polyp or a cyst in the nasal cavity and sinuses endoscopically septum looks good no evidence of any tumors masses polyps or cysts a lot of thick mucus all consistent with sinusitis I put her on a Medrol Dosepak and Zithromax as well as azelastine she claims Flonase dries her up too much and she does have underlying allergies she will follow-up and see us in 1 month if there is no significant improvement we will send her for a CAT scan of her paranasal sinuses.

## 2022-02-28 NOTE — END OF VISIT
[FreeTextEntry3] : I saw and examined this patient in person. I have discussed with Rita Davis, Physician Assistant, in detail the above note and agree with the above assessment and plan of care.\par

## 2022-02-28 NOTE — CONSULT LETTER
[Dear  ___] : Dear  [unfilled], [Consult Letter:] : I had the pleasure of evaluating your patient, [unfilled]. [Please see my note below.] : Please see my note below. [Consult Closing:] : Thank you very much for allowing me to participate in the care of this patient.  If you have any questions, please do not hesitate to contact me. [Sincerely,] : Sincerely, [FreeTextEntry3] : Will De La Fuente MD\par [facility]\par Otolaryngology and Facial Plastics\par Associated Professor, [employer]\par

## 2022-05-13 ENCOUNTER — RX RENEWAL (OUTPATIENT)
Age: 37
End: 2022-05-13

## 2022-06-06 ENCOUNTER — APPOINTMENT (OUTPATIENT)
Dept: ENDOCRINOLOGY | Facility: CLINIC | Age: 37
End: 2022-06-06

## 2022-06-23 ENCOUNTER — NON-APPOINTMENT (OUTPATIENT)
Age: 37
End: 2022-06-23

## 2022-06-24 ENCOUNTER — APPOINTMENT (OUTPATIENT)
Dept: OTOLARYNGOLOGY | Facility: CLINIC | Age: 37
End: 2022-06-24
Payer: COMMERCIAL

## 2022-06-24 ENCOUNTER — APPOINTMENT (OUTPATIENT)
Dept: ENDOCRINOLOGY | Facility: CLINIC | Age: 37
End: 2022-06-24

## 2022-06-24 ENCOUNTER — RESULT REVIEW (OUTPATIENT)
Age: 37
End: 2022-06-24

## 2022-06-24 VITALS
WEIGHT: 160 LBS | SYSTOLIC BLOOD PRESSURE: 128 MMHG | HEART RATE: 75 BPM | TEMPERATURE: 97.6 F | DIASTOLIC BLOOD PRESSURE: 84 MMHG | HEIGHT: 63 IN | OXYGEN SATURATION: 99 % | BODY MASS INDEX: 28.35 KG/M2

## 2022-06-24 DIAGNOSIS — Z86.16 PERSONAL HISTORY OF COVID-19: ICD-10-CM

## 2022-06-24 PROCEDURE — 99214 OFFICE O/P EST MOD 30 MIN: CPT | Mod: 25

## 2022-06-24 PROCEDURE — 31231 NASAL ENDOSCOPY DX: CPT

## 2022-06-24 PROCEDURE — 99214 OFFICE O/P EST MOD 30 MIN: CPT

## 2022-06-24 NOTE — ASSESSMENT
[FreeTextEntry1] : Patient seems to be heading in the right direction but then she developed COVID loss of sense of taste and smell a lot of the symptoms worsened again now cough headaches this will take time to to resolve will reevaluate her in 2months courage to continue using the nasal sprays and will consider a CAT scan at a later date if her symptoms were to persist.

## 2022-06-24 NOTE — END OF VISIT
[FreeTextEntry3] : I, Dr. De La Fuente personally performed the evaluation and management (E/M) services , including all procedures, for this established patient who presents today with (a) new problem(s)/exacerbation of (an) existing condition(s). That E/M includes conducting the clinically appropriate interval history &/or exam, assessing all new/exacerbated conditions, and establishing a new plan of care. Today, my JAMIE, Rita Davis, was here to observe &/or participate in the visit & follow plan of care established by me.\par

## 2022-06-24 NOTE — HISTORY OF PRESENT ILLNESS
[de-identified] : Patient returns today with recent recovery from COVID. She did lose her sense of smell and taste but it returned. She still has occasional nasal congestion and some dryness in her nose. She has been doing her nasal sprays. She has been feeling well in regards to sinus pressure. SHe has not been treated with antibitoic since we treated her in February. She does not have any issues with nosebleeds. The cough had improved but she still has a lingering dry cough since COVID. She reports that the steroid pack in February really helped her symptoms. She does occasionally get head pressure bewteen the eyes andin the forehead but she has been better, with it ocurring lest frequent.

## 2022-07-04 NOTE — ASSESSMENT
[Levothyroxine] : The patient was instructed to take Levothyroxine on an empty stomach, separate from vitamins, and wait at least 30 minutes before eating [FreeTextEntry1] : 37 year old female here for a followup pt visit with me for thyroid cancer\par HLD\par preDM \par high TG\par \par \par \par used to follow with Dr. 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 She was treated with surgery Dr. Vuong. Her whole body scan done in June 2016 showed 0 percent uptake and TG was 0.2\par \par \par \par 1. thyroid cancer\par Synthroid 112mcg 7 days a week and also cytomel 5mg daily \par her TG antibody and Tg have been negative; her thyroid US in 2022 shows a level 2 cervical LN, small residual tissue\par excellent response biochemically \par \par \par \par repeat thyroid US in jully along with labs TSH and TG\par \par \par pleaase note : no currentl plans for pregnancy- d/w pt when planning to be off cytomel \par also d.w pt TSH should be optimized prior to pregnancy and to discuss with me when they will be planning as TSH will need to be followed and treated as needed and to call me once preganancy confirmed to adjust dosing (will need to have increased LevoT dosing; this is especially important in the first 8 weeks when the fetus will rely on the mother for thyroid requirements) therefore dose will need to be increased by 20%)\par \par \par \par 2. HLD\par pt with HLD in past \par healthy eating \par exercise \par see Dr. Glaser d/w with pt \par TG in 700s in dec 2021- now on fibrate low fat diet \par needs to followup with Dr. Glaser\par \par \par 3. preDM\par a1c jan 2022 5.4 \par -monitor diet \par discussed healthy lifestyle which incorporates healthy diet and exercise \par \par \par \par \par 4. HCM \par check D levels \par last was 20 in dec 2021\par \par \par \par \par \par pt will have labs done in 2 weeks - she will dw Dr. Vuong also about utility in [place] now vs later due to recnet covid infection\par i also d.w pt improtance of seeing Dr. Glaser given her HLD and high TG levels\par no current plans for pregnancy \par knows to inform me of pregnancy planning (d.w pt in past visits as mediacations will need adjustments)\par \par \par \par \par \par \par \par \par \par \par \par \par

## 2022-07-04 NOTE — HISTORY OF PRESENT ILLNESS
[FreeTextEntry1] : 37 year old female here for a followup  pt visit with me \par used to follow with Dr. 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 She was treated with surgery Dr. Vuong. Her whole body scan done in June 2016 showed 0 percent uptake and TG was 0.2\par \par \par \par \par thyroid cancer hx \par Synthroid 125mcg 6 days a week and once a week a half a tab \par TSH is 0.12 in april 2021 free T4 1.5\par TG antibodies are <20.0 \par TPO 12.9--> to 29 april 2021\par \par since august 2020  she has felt more +fatigued more than usual \par gianing weight \par +has stressors \par +consitpation \par she has gone to GI for constipation as well\par she was started recently on cytomel and takes it at night and feels at times better \par has joint pains since and more fatigue since dose adjustment in august 2020 \par  \par \par dec 2021\par EVENTS: diagnoses of anemia, intermittently on iron \par she is taking levoT (SYNTHROID) 88mcg daily (was to take half a a dose as well and is not taking this)\par cytomel 5mg interrmittently \par weight as a overall trend increasing to 158 from 149 \par no constipation \par diarrhea at times\par no tremors or palptiatons \par \par she is on  iron and we disucssed that this is spaced out from levoT\par periods are regular\par no current plans for pregnancy \par \par \par June 2022\par no acute events \par tolerating LevoT and cytomel\par continues to feel fatigue \par \par she had covid 2 weeks ago- is recovering \par \par \par jan 2022\par TSH 0.49\par TG NEGATIVE <0.20 \par TG antibodies <20\par \par \par \par Imaging:\par \par US in Oct 2021 - is concerning for Level 2 L- d/w radiology via email who suggested to monitor in 6 months \par Labs:\par TG remains negative at <0.20 and TG antibodies <.20\par \par \par US \par  EXAM:  US THYROID ONLY\par \par \par PROCEDURE DATE:  10/05/2021\par \par \par \par INTERPRETATION:  CLINICAL INFORMATION: thyroid cancer hx; Ordering Dxs: C73 Malignant neoplasm of thyroid gland /// Admitting Dxs: C73 MALIGNANT NEOPLASM OF THYROID GLAND\par \par COMPARISON: 6.30.20.\par \par TECHNIQUE: Sonography of the thyroid.\par \par FINDINGS:\par Unremarkable thyroidectomy bed.\par \par Cervical Lymph Nodes: There is a 2.7 x 0.9 cm right level 2 node.\par \par IMPRESSION:\par \par Small right level 2 node. Unremarkable thyroidectomy bed.\par \par  \par \par US jan 2022\par 	\par IMPRESSION:\par \par Status post total thyroidectomy. Small remnant thyroid tissue in the left surgical bed.\par \par Enlarged level 2 lymph nodes of benign morphology.\par \par \par \par \par \par \par \par \par \par 2. HLD\par \par -->73\par -->269-- placed on fibrate \par we dsicussed healthy eating and healthy diet\par referred to uri, needs followup \par has to followup \par \par \par \par 3. low D \par 20 in dec 2021 \par \par She is on vitamin D and B12 intemittently \par \par PSH\par total thyroidectomy \par \par \par SH \par \par no smoking \par drinks socially \par \par

## 2022-07-04 NOTE — PHYSICAL EXAM
[Alert] : alert [Well Nourished] : well nourished [No Acute Distress] : no acute distress [EOMI] : extra ocular movement intact [Normal Hearing] : hearing was normal [No Neck Mass] : no neck mass was observed [Thyroid Not Enlarged] : the thyroid was not enlarged [No Thyroid Nodules] : no palpable thyroid nodules [No Respiratory Distress] : no respiratory distress [No Accessory Muscle Use] : no accessory muscle use [Clear to Auscultation] : lungs were clear to auscultation bilaterally [Normal to Percussion] : lungs were normal to percussion [Normal S1, S2] : normal S1 and S2 [Normal Rate] : heart rate was normal [Regular Rhythm] : with a regular rhythm [Normal Bowel Sounds] : normal bowel sounds [Not Tender] : non-tender [Soft] : abdomen soft [No HSM] : no hepato-splenomegaly [Normal Gait] : normal gait [No Joint Swelling] : no joint swelling seen [Oriented x3] : oriented to person, place, and time [Normal Affect] : the affect was normal [Normal Mood] : the mood was normal [Normal Insight/Judgement] : insight and judgment were intact

## 2022-07-08 ENCOUNTER — OUTPATIENT (OUTPATIENT)
Dept: OUTPATIENT SERVICES | Facility: HOSPITAL | Age: 37
LOS: 1 days | End: 2022-07-08
Payer: COMMERCIAL

## 2022-07-08 ENCOUNTER — APPOINTMENT (OUTPATIENT)
Dept: ULTRASOUND IMAGING | Facility: CLINIC | Age: 37
End: 2022-07-08

## 2022-07-08 DIAGNOSIS — C73 MALIGNANT NEOPLASM OF THYROID GLAND: ICD-10-CM

## 2022-07-08 DIAGNOSIS — Z98.89 OTHER SPECIFIED POSTPROCEDURAL STATES: Chronic | ICD-10-CM

## 2022-07-08 DIAGNOSIS — Z64.0 PROBLEMS RELATED TO UNWANTED PREGNANCY: Chronic | ICD-10-CM

## 2022-07-08 DIAGNOSIS — Z00.8 ENCOUNTER FOR OTHER GENERAL EXAMINATION: ICD-10-CM

## 2022-07-08 PROCEDURE — 76536 US EXAM OF HEAD AND NECK: CPT | Mod: 26

## 2022-07-08 PROCEDURE — 76536 US EXAM OF HEAD AND NECK: CPT

## 2022-07-14 ENCOUNTER — APPOINTMENT (OUTPATIENT)
Dept: SURGERY | Facility: CLINIC | Age: 37
End: 2022-07-14

## 2022-07-14 PROCEDURE — 99213 OFFICE O/P EST LOW 20 MIN: CPT

## 2022-07-14 RX ORDER — AZITHROMYCIN 250 MG/1
250 TABLET, FILM COATED ORAL
Qty: 1 | Refills: 0 | Status: COMPLETED | COMMUNITY
Start: 2022-02-28 | End: 2022-07-14

## 2022-07-14 RX ORDER — METHYLPREDNISOLONE 4 MG/1
4 TABLET ORAL
Qty: 21 | Refills: 0 | Status: COMPLETED | COMMUNITY
Start: 2022-02-28 | End: 2022-07-14

## 2022-07-14 NOTE — PHYSICAL EXAM
[de-identified] : well healed scar [Midline] : located in midline position [Normal] : orientation to person, place, and time: normal

## 2022-07-14 NOTE — ASSESSMENT
[FreeTextEntry1] : Thyroid malignancy\par sonogram report reviewed with Dr Vuong and patient\par repeat sonogram next visit\par f/u labs with Dr Ware\par f/u 6 months

## 2022-07-20 LAB
25(OH)D3 SERPL-MCNC: 25.4 NG/ML
CHOLEST SERPL-MCNC: 220 MG/DL
ESTIMATED AVERAGE GLUCOSE: 120 MG/DL
HBA1C MFR BLD HPLC: 5.8 %
HDLC SERPL-MCNC: 39 MG/DL
LDLC SERPL CALC-MCNC: 136 MG/DL
NONHDLC SERPL-MCNC: 181 MG/DL
THYROGLOB AB SERPL-ACNC: <20 IU/ML
THYROGLOB AB SERPL-ACNC: <20 IU/ML
THYROGLOB SERPL-MCNC: <0.2 NG/ML
THYROGLOB SERPL-MCNC: <0.2 NG/ML
TRIGL SERPL-MCNC: 226 MG/DL
TSH SERPL-ACNC: 1.58 UIU/ML
TSH SERPL-ACNC: 1.6 UIU/ML

## 2022-07-25 ENCOUNTER — NON-APPOINTMENT (OUTPATIENT)
Age: 37
End: 2022-07-25

## 2022-07-27 LAB
CREAT SPEC-SCNC: 49 MG/DL
MICROALBUMIN 24H UR DL<=1MG/L-MCNC: <1.2 MG/DL
MICROALBUMIN/CREAT 24H UR-RTO: NORMAL MG/G

## 2022-10-10 ENCOUNTER — RX RENEWAL (OUTPATIENT)
Age: 37
End: 2022-10-10

## 2022-10-12 ENCOUNTER — RX RENEWAL (OUTPATIENT)
Age: 37
End: 2022-10-12

## 2022-10-13 ENCOUNTER — NON-APPOINTMENT (OUTPATIENT)
Age: 37
End: 2022-10-13

## 2022-10-13 ENCOUNTER — APPOINTMENT (OUTPATIENT)
Dept: OTOLARYNGOLOGY | Facility: CLINIC | Age: 37
End: 2022-10-13

## 2022-10-13 VITALS
WEIGHT: 152 LBS | BODY MASS INDEX: 26.93 KG/M2 | TEMPERATURE: 98 F | HEIGHT: 63 IN | HEART RATE: 80 BPM | DIASTOLIC BLOOD PRESSURE: 77 MMHG | SYSTOLIC BLOOD PRESSURE: 118 MMHG

## 2022-10-13 PROCEDURE — 31231 NASAL ENDOSCOPY DX: CPT

## 2022-10-13 PROCEDURE — 99214 OFFICE O/P EST MOD 30 MIN: CPT | Mod: 25

## 2022-10-13 NOTE — ASSESSMENT
[FreeTextEntry1] : Patient 37 follows up feeling a lot better symptoms seem to be improved on examination still little swollen she will continue use the azelastine since she has underlying allergies no pus or purulence he continues to get pressure I gave her some literature about balloon sinuplasty that may be of benefit for her she will think about it and get back to us if she wants to have in that direction.  Otherwise no further acute interventions indicated at this time.

## 2022-10-13 NOTE — END OF VISIT
[FreeTextEntry3] : I, Dr. De La Fuente personally performed the evaluation and management (E/M) services , including all procedures, for this established patient who presents today with (a) new problem(s)/exacerbation of (an) existing condition(s). That E/M includes conducting the clinically appropriate interval history &/or exam, assessing all new/exacerbated conditions, and establishing a new plan of care. Today, my JAMIE, Rita Davis, was here to observe &/or participate in the visit & follow plan of care established by me.\par \par \par

## 2022-10-13 NOTE — HISTORY OF PRESENT ILLNESS
[de-identified] : Patient has a history of a septoplasty in 2016, states that she still has a lot of mucus and coughing. When she coughs she does not bring up mucus all the time. She states when she cries she gets excessive mucus. She is not doing any nasal sprays regularly right now. She does not have any recent sinus infections treated with antibiotics. \par She does have seasonal allergies \par She feels she has a lot of build up in the chest and uses inhalers as needed.

## 2023-01-10 ENCOUNTER — APPOINTMENT (OUTPATIENT)
Dept: ULTRASOUND IMAGING | Facility: IMAGING CENTER | Age: 38
End: 2023-01-10
Payer: COMMERCIAL

## 2023-01-10 ENCOUNTER — OUTPATIENT (OUTPATIENT)
Dept: OUTPATIENT SERVICES | Facility: HOSPITAL | Age: 38
LOS: 1 days | End: 2023-01-10
Payer: COMMERCIAL

## 2023-01-10 DIAGNOSIS — Z98.89 OTHER SPECIFIED POSTPROCEDURAL STATES: Chronic | ICD-10-CM

## 2023-01-10 DIAGNOSIS — C73 MALIGNANT NEOPLASM OF THYROID GLAND: ICD-10-CM

## 2023-01-10 DIAGNOSIS — Z64.0 PROBLEMS RELATED TO UNWANTED PREGNANCY: Chronic | ICD-10-CM

## 2023-01-10 PROCEDURE — 76536 US EXAM OF HEAD AND NECK: CPT | Mod: 26

## 2023-01-10 PROCEDURE — 76536 US EXAM OF HEAD AND NECK: CPT

## 2023-01-11 ENCOUNTER — NON-APPOINTMENT (OUTPATIENT)
Age: 38
End: 2023-01-11

## 2023-01-12 ENCOUNTER — APPOINTMENT (OUTPATIENT)
Dept: OTOLARYNGOLOGY | Facility: CLINIC | Age: 38
End: 2023-01-12

## 2023-01-17 ENCOUNTER — APPOINTMENT (OUTPATIENT)
Dept: SURGERY | Facility: CLINIC | Age: 38
End: 2023-01-17
Payer: COMMERCIAL

## 2023-01-17 PROCEDURE — 99214 OFFICE O/P EST MOD 30 MIN: CPT

## 2023-01-17 NOTE — HISTORY OF PRESENT ILLNESS
[de-identified] : 8  years s/p total thyroidectomy for malignancy. feels fatigued on Synthroid 112 mcg and Cytomel 5 mcg daily.. denies dysphagia, hoarseness or new lesions. no changes medically since last visit. recent sonogram ALFIE with benign appearing nodes.  TG low.     I have reviewed all old and new data and available images.

## 2023-01-17 NOTE — PHYSICAL EXAM
[de-identified] : well healed scar [de-identified] : no palpable thyroid bed nodules [Laryngoscopy Performed] : laryngoscopy was performed, see procedure section for findings [Midline] : located in midline position [Normal] : orientation to person, place, and time: normal

## 2023-01-17 NOTE — ASSESSMENT
[FreeTextEntry1] : will observe. blood  tests and sonogram per dr monaco.   to return earlier if any change.  patient has been given the opportunity to ask questions, and all of the patient's questions have been answered to their satisfaction.

## 2023-02-07 ENCOUNTER — RX RENEWAL (OUTPATIENT)
Age: 38
End: 2023-02-07

## 2023-02-16 ENCOUNTER — APPOINTMENT (OUTPATIENT)
Dept: OTOLARYNGOLOGY | Facility: CLINIC | Age: 38
End: 2023-02-16
Payer: COMMERCIAL

## 2023-02-16 VITALS
TEMPERATURE: 98 F | WEIGHT: 152 LBS | DIASTOLIC BLOOD PRESSURE: 83 MMHG | HEIGHT: 63 IN | HEART RATE: 92 BPM | BODY MASS INDEX: 26.93 KG/M2 | SYSTOLIC BLOOD PRESSURE: 134 MMHG

## 2023-02-16 DIAGNOSIS — J33.8 OTHER POLYP OF SINUS: ICD-10-CM

## 2023-02-16 DIAGNOSIS — R09.82 POSTNASAL DRIP: ICD-10-CM

## 2023-02-16 PROCEDURE — 31231 NASAL ENDOSCOPY DX: CPT

## 2023-02-16 PROCEDURE — 99213 OFFICE O/P EST LOW 20 MIN: CPT | Mod: 25

## 2023-02-16 NOTE — HISTORY OF PRESENT ILLNESS
[de-identified] : Patient had a sinusitis a few weeks ago and was given Zpack which helped a little but the lingering nasal congestion and sinus pressure continues.The yellow green mucus has thinned out.  She admits that she doesn’t do any nasal sprays but has been doing sinus rinses.\par This is her pattern where she will go on antibitoics, feel improvement but still have the same symptoms. \par Asa  result of not being able to breathe through the nose, she wakes up with extreme dry mouth- causing sore throat \par When she had that sinusitis last month she admits that the pressure and nasal congestion was severe. She is due to fly in two weeks and admits that she is always in a lot of pain in the cheeks and the nasal congestion worsens and lingers.\par She does have seasonal allergies and during alelrgy season takes claritin and uses the nasal sprays then.  \par She recalls a CT sinus a few years ago shows polyp in the sinuses

## 2023-02-16 NOTE — ASSESSMENT
[FreeTextEntry1] : Getting recurrent sinus infections endoscopically has a deviated septum just treated with 2 courses of antibiotics will get a CAT scan and follow-up with us after the CAT scan

## 2023-02-16 NOTE — END OF VISIT
[FreeTextEntry3] : I, Dr. De La Fuente personally performed the evaluation and management (E/M) services including all necessary procedures, for this new patient. That E/M includes conducting the clinically appropriate initial history &/or exam, assessing all conditions, and establishing the plan of care. Today, my JAMIE, Rita Davis, was here to observe &/or participate in the visit & follow plan of care established by me.\par \par \par

## 2023-02-16 NOTE — REVIEW OF SYSTEMS
[Seasonal Allergies] : seasonal allergies [Post Nasal Drip] : post nasal drip [As Noted in HPI] : as noted in HPI [Nasal Congestion] : nasal congestion [Recurrent Sinus Infections] : recurrent sinus infections [Sinus Pain] : sinus pain [Sinus Pressure] : sinus pressure [Throat Pain] : throat pain [Negative] : Heme/Lymph

## 2023-02-17 ENCOUNTER — APPOINTMENT (OUTPATIENT)
Dept: ENDOCRINOLOGY | Facility: CLINIC | Age: 38
End: 2023-02-17
Payer: COMMERCIAL

## 2023-02-17 ENCOUNTER — LABORATORY RESULT (OUTPATIENT)
Age: 38
End: 2023-02-17

## 2023-02-17 VITALS
WEIGHT: 152 LBS | SYSTOLIC BLOOD PRESSURE: 110 MMHG | DIASTOLIC BLOOD PRESSURE: 80 MMHG | HEART RATE: 78 BPM | HEIGHT: 63 IN | BODY MASS INDEX: 26.93 KG/M2 | OXYGEN SATURATION: 97 %

## 2023-02-17 PROCEDURE — 99214 OFFICE O/P EST MOD 30 MIN: CPT

## 2023-02-21 ENCOUNTER — RESULT REVIEW (OUTPATIENT)
Age: 38
End: 2023-02-21

## 2023-02-21 ENCOUNTER — NON-APPOINTMENT (OUTPATIENT)
Age: 38
End: 2023-02-21

## 2023-03-02 ENCOUNTER — NON-APPOINTMENT (OUTPATIENT)
Age: 38
End: 2023-03-02

## 2023-03-08 ENCOUNTER — APPOINTMENT (OUTPATIENT)
Dept: CT IMAGING | Facility: IMAGING CENTER | Age: 38
End: 2023-03-08
Payer: COMMERCIAL

## 2023-03-08 ENCOUNTER — OUTPATIENT (OUTPATIENT)
Dept: OUTPATIENT SERVICES | Facility: HOSPITAL | Age: 38
LOS: 1 days | End: 2023-03-08
Payer: COMMERCIAL

## 2023-03-08 DIAGNOSIS — J32.0 CHRONIC MAXILLARY SINUSITIS: ICD-10-CM

## 2023-03-08 DIAGNOSIS — Z64.0 PROBLEMS RELATED TO UNWANTED PREGNANCY: Chronic | ICD-10-CM

## 2023-03-08 DIAGNOSIS — Z98.89 OTHER SPECIFIED POSTPROCEDURAL STATES: Chronic | ICD-10-CM

## 2023-03-08 PROCEDURE — 70486 CT MAXILLOFACIAL W/O DYE: CPT | Mod: 26

## 2023-03-08 PROCEDURE — 70486 CT MAXILLOFACIAL W/O DYE: CPT

## 2023-03-10 ENCOUNTER — APPOINTMENT (OUTPATIENT)
Dept: CARDIOLOGY | Facility: CLINIC | Age: 38
End: 2023-03-10
Payer: COMMERCIAL

## 2023-03-10 VITALS
HEART RATE: 83 BPM | TEMPERATURE: 98 F | BODY MASS INDEX: 27.11 KG/M2 | SYSTOLIC BLOOD PRESSURE: 118 MMHG | DIASTOLIC BLOOD PRESSURE: 80 MMHG | WEIGHT: 153 LBS | RESPIRATION RATE: 16 BRPM | OXYGEN SATURATION: 99 % | HEIGHT: 63 IN

## 2023-03-10 PROCEDURE — 99214 OFFICE O/P EST MOD 30 MIN: CPT

## 2023-03-10 NOTE — HISTORY OF PRESENT ILLNESS
[FreeTextEntry1] : This is a 35 year woman with a past medical history of thyroidectomy for malignancy in 2015 and HLD who presents today for a cardiometabolic evaluation\par

## 2023-03-10 NOTE — REASON FOR VISIT
[Consultation] : a consultation regarding [CV Risk Factors and Non-Cardiac Disease] : CV risk factors and non-cardiac disease [Hyperlipidemia] : hyperlipidemia [FreeTextEntry1] : This is a 38-year-old female with past medical history significant for hypertriglyceridemia, hyperlipidemia, hypothyroidism, reactive airway disease, thyroid cancer (s/o thyroidectomy 2015), s/p COVID May [id number] who comes in for lipid evaluation. She was given a call from Dr. Ware's office on 3/2/23 where they told her that her cholesterol levels were abnormal and needed lipid follow-up evaluation.  Her most recent blood work was on 1/30/23 at [facility] and she is faxing her results over to us.\par She is overall doing well today and denies chest pain, shortness of breath, dizziness or syncope. She is currently on Fenofibrate 145 mg for triglycerides.\par She has allergies to oxycodone and a smoking history of 4 cigs/day for 5 years but stopped 10 years ago. Her father had a heart attack at age 56 and diabetes. Her mother and sister has high cholesterol, high triglycerides, and diabetes. \par \par On 1/30/23, her cholesterol was 231, triglyceride 192, HDL 46, , A1c 5.4\par On 7/8/22, her cholesterol was 220, triglyceride 226, HDL 39, , non-, A1c 5.8\par On 1/27/22, her cholesterol was 244, triglyceride 269, HDL 41, , non-\par On 12/7/21, her cholesterol was 218, triglyceride 707, HDL 28, LDL 76, non- [FreeTextEntry2] : Lipid referral

## 2023-03-10 NOTE — PHYSICAL EXAM
[Well Developed] : well developed [Well Nourished] : well nourished [No Acute Distress] : no acute distress [Normal Venous Pressure] : normal venous pressure [No Carotid Bruit] : no carotid bruit [Normal S1, S2] : normal S1, S2 [No Rub] : no rub [No Gallop] : no gallop [I] : a grade 1 [Clear Lung Fields] : clear lung fields [Good Air Entry] : good air entry [No Respiratory Distress] : no respiratory distress  [Soft] : abdomen soft [Non Tender] : non-tender [No Masses/organomegaly] : no masses/organomegaly [Normal Bowel Sounds] : normal bowel sounds [Normal Gait] : normal gait [No Edema] : no edema [No Cyanosis] : no cyanosis [No Clubbing] : no clubbing [No Varicosities] : no varicosities [No Rash] : no rash [No Skin Lesions] : no skin lesions [Moves all extremities] : moves all extremities [No Focal Deficits] : no focal deficits [Normal Speech] : normal speech [Alert and Oriented] : alert and oriented [Normal memory] : normal memory [General Appearance - Well Developed] : well developed [Normal Appearance] : normal appearance [Well Groomed] : well groomed [General Appearance - Well Nourished] : well nourished [No Deformities] : no deformities [General Appearance - In No Acute Distress] : no acute distress [Normal Conjunctiva] : the conjunctiva exhibited no abnormalities [Eyelids - No Xanthelasma] : the eyelids demonstrated no xanthelasmas [Normal Oral Mucosa] : normal oral mucosa [Normal Jugular Venous A Waves Present] : normal jugular venous A waves present [Normal Jugular Venous V Waves Present] : normal jugular venous V waves present [No Jugular Venous Concepcion A Waves] : no jugular venous concepcion A waves [5th Left ICS - MCL] : palpated at the 5th LICS in the midclavicular line [Normal] : normal [No Precordial Heave] : no precordial heave was noted [Normal Rate] : normal [Rhythm Regular] : regular [Normal S1] : normal S1 [Normal S2] : normal S2 [No Murmur] : no murmurs heard [2+] : left 2+ [No Abnormalities] : the abdominal aorta was not enlarged and no bruit was heard [No Pitting Edema] : no pitting edema present [Respiration, Rhythm And Depth] : normal respiratory rhythm and effort [] : no respiratory distress [Exaggerated Use Of Accessory Muscles For Inspiration] : no accessory muscle use [Auscultation Breath Sounds / Voice Sounds] : lungs were clear to auscultation bilaterally [Abdomen Soft] : soft [Abnormal Walk] : normal gait [Gait - Sufficient For Exercise Testing] : the gait was sufficient for exercise testing [Cyanosis, Localized] : no localized cyanosis [Skin Color & Pigmentation] : normal skin color and pigmentation [Skin Turgor] : normal skin turgor [No Xanthoma] : no  xanthoma was observed [Oriented To Time, Place, And Person] : oriented to person, place, and time [Affect] : the affect was normal [Mood] : the mood was normal [No Anxiety] : not feeling anxious [Right Femoral Bruit] : no bruit heard over the right femoral artery [Left Femoral Bruit] : no bruit heard over the left femoral artery [S3] : no S3 [Right Carotid Bruit] : no bruit heard over the right carotid [S4] : no S4 [Left Carotid Bruit] : no bruit heard over the left carotid

## 2023-03-10 NOTE — DISCUSSION/SUMMARY
[FreeTextEntry1] : This is a 38-year-old female with past medical history significant for thyroid cancer, hypertriglyceridemia, status post COVID-19 infection in May 2022, hypothyroidism, reactive airway disease, who comes in for lipid follow-up evaluation.\par She denies chest pain, shortness of breath, dizziness or syncope.  She was born in [facility] and has no history of rheumatic fever.  She does not drink excessive caffeine or alcohol.\par The patient had a lipid panel done January 30, 2023 which demonstrated cholesterol 231, triglycerides 192, HDL 46, LDL of 150 mg/dL, hemoglobin A1c of 5.4.\par The patient remains on fenofibrate 145 mg daily.  If the patient were to get pregnant she should discontinue fenofibrate therapy.  I have informed the patient of this recommendation.\par Her 10-year ASCVD risk score is 3.9% but her lifetime risk of atherosclerotic cardiovascular disease is 39%\par At this point in time I would recommend lifestyle modification, dietary intervention and exercise to improve her lipid profile.  She is planning on having children and I would not recommend statin therapy at this time.\par Cardiac risk factors include hyperlipidemia, positive family history of atherosclerosis, (father had myocardial infarction age 56 as well as diabetes), history of smoking history of 4 cigs/day for 5 years(stopped 10 years) ago. Her mother and sister has high cholesterol, high triglycerides, and diabetes. \par Her triglycerides have been as high as 707 mg/dL.\par The fenofibrate has been controlling her triglycerides adequately.\par Hopefully with lifestyle modification, she will not require additional therapy.\par On 1/30/23, her cholesterol was 231, triglyceride 192, HDL 46, , A1c 5.4\par On 7/8/22, her cholesterol was 220, triglyceride 226, HDL 39, , non-, A1c 5.8\par On 1/27/22, her cholesterol was 244, triglyceride 269, HDL 41, , non-\par On 12/7/21, her cholesterol was 218, triglyceride 707, HDL 28, LDL 76, non-\par PMH:\par She was diagnosed with thyroid cancer and underwent a thyroidectomy in 2015 followed by radioactive iodine twice.  She is currently on Synthroid therapy.\par The patient had blood work done June 8, 2020 which demonstrated cholesterol 206, triglycerides 248 mg/dL, HDL 36 mg/dL, and calculated LDL of 120 mg/dL.\par Blood work done through [facility] September 11, 2020 demonstrated cholesterol 223, triglycerides of 347, HDL of 33, calculated  mg/dL. \par She reports that her mother had high triglycerides and is currently on fenofibrate.\par She will follow-up with me in 4 to 6 months after working with a dietitian.  She is also encouraged to increase her aerobic activity to 40 minutes 4 times per week.\par The patient understands that aerobic exercises must be increased to 40 minutes 4 times per week. A detailed discussion of lifestyle modification was done today. The patient has a good understanding of the diagnosis, and treatment plan. Lifestyle modification was also outlined.\par

## 2023-03-13 ENCOUNTER — RX RENEWAL (OUTPATIENT)
Age: 38
End: 2023-03-13

## 2023-03-14 ENCOUNTER — NON-APPOINTMENT (OUTPATIENT)
Age: 38
End: 2023-03-14

## 2023-03-15 RX ORDER — NAPROXEN 500 MG/1
500 TABLET ORAL
Qty: 60 | Refills: 0 | Status: COMPLETED | COMMUNITY
Start: 2021-10-12 | End: 2023-03-15

## 2023-03-15 RX ORDER — COVID-19 ANTIGEN TEST
KIT MISCELLANEOUS
Qty: 8 | Refills: 0 | Status: COMPLETED | COMMUNITY
Start: 2022-05-13 | End: 2023-03-15

## 2023-03-15 RX ORDER — FLUTICASONE PROPIONATE 50 UG/1
50 SPRAY, METERED NASAL DAILY
Qty: 1 | Refills: 3 | Status: COMPLETED | COMMUNITY
Start: 2022-10-13 | End: 2023-03-15

## 2023-03-15 RX ORDER — METRONIDAZOLE 7.5 MG/G
0.75 GEL VAGINAL
Qty: 70 | Refills: 0 | Status: COMPLETED | COMMUNITY
Start: 2022-01-14 | End: 2023-03-15

## 2023-03-23 ENCOUNTER — NON-APPOINTMENT (OUTPATIENT)
Age: 38
End: 2023-03-23

## 2023-03-23 ENCOUNTER — APPOINTMENT (OUTPATIENT)
Dept: OTOLARYNGOLOGY | Facility: CLINIC | Age: 38
End: 2023-03-23
Payer: COMMERCIAL

## 2023-03-23 VITALS
HEART RATE: 81 BPM | WEIGHT: 153 LBS | DIASTOLIC BLOOD PRESSURE: 82 MMHG | BODY MASS INDEX: 27.11 KG/M2 | SYSTOLIC BLOOD PRESSURE: 121 MMHG | TEMPERATURE: 97.4 F | HEIGHT: 63 IN

## 2023-03-23 DIAGNOSIS — J34.2 DEVIATED NASAL SEPTUM: ICD-10-CM

## 2023-03-23 DIAGNOSIS — J32.0 CHRONIC MAXILLARY SINUSITIS: ICD-10-CM

## 2023-03-23 PROCEDURE — 31231 NASAL ENDOSCOPY DX: CPT

## 2023-03-23 PROCEDURE — 99214 OFFICE O/P EST MOD 30 MIN: CPT | Mod: 25

## 2023-03-23 NOTE — DATA REVIEWED
[de-identified] : CT sinus: DNS to the left with left septal spur; left maxillary sinus opacification

## 2023-03-23 NOTE — HISTORY OF PRESENT ILLNESS
[de-identified] : Patient comes in today with continued nasal breathing issue especially through the left nasal cavity. She has issues with headaches and facial pressure as well. SHe had her CT done recently  and is here today review. SHe still does her nasal sparys but has not done her sinus rinses recently. SHe feels pressure int he left cheek sinus but it is mild and dull. She feels the upper teeth have a lot of pressure built up behind them on the left side as well. \par She finished her augmentin a few days ago

## 2023-03-23 NOTE — ASSESSMENT
[FreeTextEntry1] : Patient follows up CAT scan was reviewed with the patient she was on antibiotics feeling much better endoscopically we showed her her spur of her septum deflected into the left nasal cavity as well as a totally opacified left maxillary sinus.  She is currently a candidate for septoplasty as well as endoscopic sinus surgery risks and benefits were discussed and accepted by her.  Of interest she claims that she has had a office-based septoplasty by Dr. Hull in the past with Xanax and however her CAT scan was reviewed with her which showed the spur that either has persisted it was never addressed.

## 2023-03-27 ENCOUNTER — NON-APPOINTMENT (OUTPATIENT)
Age: 38
End: 2023-03-27

## 2023-05-03 ENCOUNTER — NON-APPOINTMENT (OUTPATIENT)
Age: 38
End: 2023-05-03

## 2023-05-03 LAB
25(OH)D3 SERPL-MCNC: 46.4 NG/ML
THYROGLOB AB SERPL-ACNC: <20 IU/ML
THYROGLOB SERPL-MCNC: <0.2 NG/ML
TSH SERPL-ACNC: 0.09 UIU/ML
TSH SERPL-ACNC: 0.53 UIU/ML

## 2023-05-16 ENCOUNTER — OUTPATIENT (OUTPATIENT)
Dept: OUTPATIENT SERVICES | Facility: HOSPITAL | Age: 38
LOS: 1 days | End: 2023-05-16

## 2023-05-16 VITALS
TEMPERATURE: 97 F | HEIGHT: 62 IN | OXYGEN SATURATION: 99 % | SYSTOLIC BLOOD PRESSURE: 127 MMHG | HEART RATE: 85 BPM | WEIGHT: 154.98 LBS | RESPIRATION RATE: 16 BRPM | DIASTOLIC BLOOD PRESSURE: 88 MMHG

## 2023-05-16 DIAGNOSIS — Z98.89 OTHER SPECIFIED POSTPROCEDURAL STATES: Chronic | ICD-10-CM

## 2023-05-16 DIAGNOSIS — Z64.0 PROBLEMS RELATED TO UNWANTED PREGNANCY: Chronic | ICD-10-CM

## 2023-05-16 DIAGNOSIS — J32.0 CHRONIC MAXILLARY SINUSITIS: ICD-10-CM

## 2023-05-16 DIAGNOSIS — K21.9 GASTRO-ESOPHAGEAL REFLUX DISEASE WITHOUT ESOPHAGITIS: ICD-10-CM

## 2023-05-16 DIAGNOSIS — Z98.890 OTHER SPECIFIED POSTPROCEDURAL STATES: Chronic | ICD-10-CM

## 2023-05-16 DIAGNOSIS — E89.0 POSTPROCEDURAL HYPOTHYROIDISM: Chronic | ICD-10-CM

## 2023-05-16 DIAGNOSIS — E03.9 HYPOTHYROIDISM, UNSPECIFIED: ICD-10-CM

## 2023-05-16 LAB
ALBUMIN SERPL ELPH-MCNC: 4.9 G/DL — SIGNIFICANT CHANGE UP (ref 3.3–5)
ALP SERPL-CCNC: 45 U/L — SIGNIFICANT CHANGE UP (ref 40–120)
ALT FLD-CCNC: 16 U/L — SIGNIFICANT CHANGE UP (ref 4–33)
ANION GAP SERPL CALC-SCNC: 13 MMOL/L — SIGNIFICANT CHANGE UP (ref 7–14)
AST SERPL-CCNC: 18 U/L — SIGNIFICANT CHANGE UP (ref 4–32)
BILIRUB SERPL-MCNC: <0.2 MG/DL — SIGNIFICANT CHANGE UP (ref 0.2–1.2)
BUN SERPL-MCNC: 20 MG/DL — SIGNIFICANT CHANGE UP (ref 7–23)
CALCIUM SERPL-MCNC: 9.6 MG/DL — SIGNIFICANT CHANGE UP (ref 8.4–10.5)
CHLORIDE SERPL-SCNC: 99 MMOL/L — SIGNIFICANT CHANGE UP (ref 98–107)
CO2 SERPL-SCNC: 23 MMOL/L — SIGNIFICANT CHANGE UP (ref 22–31)
CREAT SERPL-MCNC: 1.06 MG/DL — SIGNIFICANT CHANGE UP (ref 0.5–1.3)
EGFR: 69 ML/MIN/1.73M2 — SIGNIFICANT CHANGE UP
GLUCOSE SERPL-MCNC: 78 MG/DL — SIGNIFICANT CHANGE UP (ref 70–99)
HCG UR QL: NEGATIVE — SIGNIFICANT CHANGE UP
HCT VFR BLD CALC: 34.5 % — SIGNIFICANT CHANGE UP (ref 34.5–45)
HGB BLD-MCNC: 11 G/DL — LOW (ref 11.5–15.5)
MCHC RBC-ENTMCNC: 25.9 PG — LOW (ref 27–34)
MCHC RBC-ENTMCNC: 31.9 GM/DL — LOW (ref 32–36)
MCV RBC AUTO: 81.2 FL — SIGNIFICANT CHANGE UP (ref 80–100)
NRBC # BLD: 0 /100 WBCS — SIGNIFICANT CHANGE UP (ref 0–0)
NRBC # FLD: 0 K/UL — SIGNIFICANT CHANGE UP (ref 0–0)
PLATELET # BLD AUTO: 528 K/UL — HIGH (ref 150–400)
POTASSIUM SERPL-MCNC: 3.9 MMOL/L — SIGNIFICANT CHANGE UP (ref 3.5–5.3)
POTASSIUM SERPL-SCNC: 3.9 MMOL/L — SIGNIFICANT CHANGE UP (ref 3.5–5.3)
PROT SERPL-MCNC: 8.3 G/DL — SIGNIFICANT CHANGE UP (ref 6–8.3)
RBC # BLD: 4.25 M/UL — SIGNIFICANT CHANGE UP (ref 3.8–5.2)
RBC # FLD: 13.8 % — SIGNIFICANT CHANGE UP (ref 10.3–14.5)
SODIUM SERPL-SCNC: 135 MMOL/L — SIGNIFICANT CHANGE UP (ref 135–145)
WBC # BLD: 9.99 K/UL — SIGNIFICANT CHANGE UP (ref 3.8–10.5)
WBC # FLD AUTO: 9.99 K/UL — SIGNIFICANT CHANGE UP (ref 3.8–10.5)

## 2023-05-16 NOTE — H&P PST ADULT - FUNCTIONAL STATUS
Walks 1 to 2 blocks, climbs 1 flight of stairs, ADLs/4-10 METS Walks 1 to 2 blocks, climbs 1- 2  flight of stairs, ADLs/4-10 METS

## 2023-05-16 NOTE — H&P PST ADULT - RESPIRATORY RATE (BREATHS/MIN)
Mr. Plascencia' family is at bedside and states that he had multiple episodes of shaking that occurred after coughing, and we also noted to be in the setting of fevers. He has chronically low BP. He has no history of prior seizures. The episodes have abated since he started abx. PE: Debilitated and on BIPAP. Weak throughout and possibly weaker on the left. A/P: Likely convulsive tussive syncope v febrile seizures. Given the history of the events only occurring after he coughs and then he has LOC and shaking, convulsive syncope becomes more likely. EEG when able. No AEDs for now. 16

## 2023-05-16 NOTE — H&P PST ADULT - PROBLEM SELECTOR PLAN 1
Patient tentatively scheduled for septoplasty, functional endoscopic sinus surgery left maxillary sinus with [facility] on 05/24/2023  Pre-op instructions provided.   Pt verbalized understanding with return demonstration. Patient tentatively scheduled for septoplasty, functional endoscopic sinus surgery left maxillary sinus with [facility] on 05/24/2023  Pre-op instructions provided.   Pt verbalized understanding with return demonstration.    Urine cup provided for day of procedure pregnancy test.     Labs done

## 2023-05-16 NOTE — H&P PST ADULT - MUSCULOSKELETAL
ROM intact/no calf tenderness/strength 5/5 bilateral upper extremities/strength 5/5 bilateral lower extremities/extremities exam details… ROM intact/no calf tenderness/normal gait/strength 5/5 bilateral upper extremities/strength 5/5 bilateral lower extremities/extremities exam

## 2023-05-16 NOTE — H&P PST ADULT - ENT GEN HX ROS MEA POS PC
nasal discharge/post-nasal discharge sinus symptoms/nasal congestion/nasal discharge/post-nasal discharge

## 2023-05-16 NOTE — H&P PST ADULT - NSICDXPASTSURGICALHX_GEN_ALL_CORE_FT
PAST SURGICAL HISTORY:  H/O total thyroidectomy     S/P abdominoplasty 2010    S/P dilatation and curettage 2004    Unwanted pregnancy 2001     PAST SURGICAL HISTORY:  H/O endoscopy     H/O total thyroidectomy     S/P abdominoplasty 2010    S/P dilatation and curettage 2004    Unwanted pregnancy 2001

## 2023-05-16 NOTE — H&P PST ADULT - NSICDXPASTMEDICALHX_GEN_ALL_CORE_FT
PAST MEDICAL HISTORY:  Hypothyroidism     Thyroid cancer      PAST MEDICAL HISTORY:  Anemia     Chronic maxillary sinusitis     GERD (gastroesophageal reflux disease)     HLD (hyperlipidemia)     Hypothyroidism     Reactive airway disease     Thyroid cancer

## 2023-05-16 NOTE — H&P PST ADULT - NEGATIVE CARDIOVASCULAR SYMPTOMS
no chest pain/no palpitations/no dyspnea on exertion/no orthopnea/no paroxysmal nocturnal dyspnea/no claudication no chest pain/no palpitations/no dyspnea on exertion

## 2023-05-16 NOTE — H&P PST ADULT - NSCAGESTDRUGCUTDN_GEN_A_CORE_SD
Problem: Impaired Functional Mobility  Goal: Achieve highest/safest level of mobility/gait  Interventions:  - Assess patient's functional ability and stability  - Promote increasing activity/tolerance for mobility and gait  - Educate and engage patient/fam no

## 2023-05-19 ENCOUNTER — NON-APPOINTMENT (OUTPATIENT)
Age: 38
End: 2023-05-19

## 2023-05-23 ENCOUNTER — TRANSCRIPTION ENCOUNTER (OUTPATIENT)
Age: 38
End: 2023-05-23

## 2023-05-23 VITALS
RESPIRATION RATE: 18 BRPM | OXYGEN SATURATION: 100 % | SYSTOLIC BLOOD PRESSURE: 142 MMHG | TEMPERATURE: 98 F | DIASTOLIC BLOOD PRESSURE: 92 MMHG | HEART RATE: 88 BPM | WEIGHT: 154.32 LBS

## 2023-05-24 ENCOUNTER — NON-APPOINTMENT (OUTPATIENT)
Age: 38
End: 2023-05-24

## 2023-05-24 ENCOUNTER — RESULT REVIEW (OUTPATIENT)
Age: 38
End: 2023-05-24

## 2023-05-24 ENCOUNTER — APPOINTMENT (OUTPATIENT)
Dept: OTOLARYNGOLOGY | Facility: AMBULATORY SURGERY CENTER | Age: 38
End: 2023-05-24

## 2023-05-24 ENCOUNTER — OUTPATIENT (OUTPATIENT)
Dept: OUTPATIENT SERVICES | Facility: HOSPITAL | Age: 38
LOS: 1 days | Discharge: HOME | End: 2023-05-24
Payer: COMMERCIAL

## 2023-05-24 ENCOUNTER — TRANSCRIPTION ENCOUNTER (OUTPATIENT)
Age: 38
End: 2023-05-24

## 2023-05-24 VITALS
OXYGEN SATURATION: 100 % | TEMPERATURE: 98 F | SYSTOLIC BLOOD PRESSURE: 151 MMHG | HEART RATE: 75 BPM | RESPIRATION RATE: 15 BRPM | DIASTOLIC BLOOD PRESSURE: 95 MMHG

## 2023-05-24 DIAGNOSIS — Z64.0 PROBLEMS RELATED TO UNWANTED PREGNANCY: Chronic | ICD-10-CM

## 2023-05-24 DIAGNOSIS — E89.0 POSTPROCEDURAL HYPOTHYROIDISM: Chronic | ICD-10-CM

## 2023-05-24 DIAGNOSIS — J32.0 CHRONIC MAXILLARY SINUSITIS: ICD-10-CM

## 2023-05-24 DIAGNOSIS — Z98.89 OTHER SPECIFIED POSTPROCEDURAL STATES: Chronic | ICD-10-CM

## 2023-05-24 DIAGNOSIS — Z98.890 OTHER SPECIFIED POSTPROCEDURAL STATES: Chronic | ICD-10-CM

## 2023-05-24 PROCEDURE — 61782 SCAN PROC CRANIAL EXTRA: CPT

## 2023-05-24 PROCEDURE — 88311 DECALCIFY TISSUE: CPT | Mod: 26

## 2023-05-24 PROCEDURE — 88305 TISSUE EXAM BY PATHOLOGIST: CPT | Mod: 26

## 2023-05-24 PROCEDURE — 31255 NSL/SINS NDSC W/TOT ETHMDCT: CPT | Mod: 50

## 2023-05-24 PROCEDURE — 30140 RESECT INFERIOR TURBINATE: CPT | Mod: 50

## 2023-05-24 PROCEDURE — 31267 ENDOSCOPY MAXILLARY SINUS: CPT | Mod: 50

## 2023-05-24 PROCEDURE — 30520 REPAIR OF NASAL SEPTUM: CPT

## 2023-05-24 PROCEDURE — 88304 TISSUE EXAM BY PATHOLOGIST: CPT | Mod: 26

## 2023-05-24 DEVICE — ARISTA ENT KIT (1) 2G HEMADERM AND (2) FLEXITIP APPLICATOR: Type: IMPLANTABLE DEVICE | Status: FUNCTIONAL

## 2023-05-24 RX ORDER — AZITHROMYCIN 500 MG/1
1 TABLET, FILM COATED ORAL
Qty: 6 | Refills: 0
Start: 2023-05-24 | End: 2023-05-28

## 2023-05-24 NOTE — PACU DISCHARGE NOTE - HYDRATION STATUS:
Drink lots of fluids, use humidifier in the bedroom at night, may use a salt water gargle made from 1/4 teaspoon of salt in a large glass of warm water that you have stirred sip and gargle then spit.  You may do this every 2-4 hours while awake.  There is also an over-the-counter product called Cepacol which may ease a sore throat.  We have sent a strep culture.  You will be notified if the culture grows strep.  You may also do home self testing for COVID.  Please be aware that immediate care is not exhaustive.  Follow-up with primary care provider if symptoms persist.   Satisfactory

## 2023-05-24 NOTE — ASU DISCHARGE PLAN (ADULT/PEDIATRIC) - CARE PROVIDER_API CALL
Will De La Fuente (MD)  Facial Plastic and Reconstructive Surgery; Otolaryngology  [address] [place] [address], [facility] [address]  [place] [address]  Phone: [phone]  Fax: [phone]  Follow Up Time:

## 2023-05-24 NOTE — ASU DISCHARGE PLAN (ADULT/PEDIATRIC) - PAIN MANAGEMENT
will read unsubmitted in sunrise!/Prescriptions electronically submitted to pharmacy from doctor's office

## 2023-05-24 NOTE — ASU DISCHARGE PLAN (ADULT/PEDIATRIC) - DIET/FLUID RESTRICTION
Progress slowly/No caffeine/spices/citrus/alcohol/Increase fluids Progress slowly/No caffeine/spices/citrus/alcohol/Increase fluids/Other (specify diet and fluid)

## 2023-05-24 NOTE — ASU DISCHARGE PLAN (ADULT/PEDIATRIC) - NS MD DC FALL RISK RISK
For information on Fall & Injury Prevention, visit: [email or web address] OR  [email or web address] OR  https://www.cdc.gov/steadi/patient.html

## 2023-05-25 ENCOUNTER — APPOINTMENT (OUTPATIENT)
Dept: OTOLARYNGOLOGY | Facility: CLINIC | Age: 38
End: 2023-05-25
Payer: COMMERCIAL

## 2023-05-25 VITALS
BODY MASS INDEX: 27.11 KG/M2 | WEIGHT: 153 LBS | TEMPERATURE: 97.9 F | SYSTOLIC BLOOD PRESSURE: 135 MMHG | HEIGHT: 63 IN | DIASTOLIC BLOOD PRESSURE: 94 MMHG | HEART RATE: 86 BPM

## 2023-05-25 PROCEDURE — 99024 POSTOP FOLLOW-UP VISIT: CPT

## 2023-05-25 PROCEDURE — 31237 NSL/SINS NDSC SURG BX POLYPC: CPT | Mod: 50,58

## 2023-05-25 NOTE — REASON FOR VISIT
[Post-Operative Visit] : a post-operative visit [FreeTextEntry2] : s/p septoplasty turbinoplasty and FESS

## 2023-05-25 NOTE — HISTORY OF PRESENT ILLNESS
[de-identified] : Patient is 1 day s/p septoplasty and FESS. She is having moderate pressure and headache with the packs in the nose. She has not had any fevers or chills. SHe started her antibiotics last night. She took just regular tylenol and motrin for pain control as she cannot tolerate narcotics and none were given. She had to change the tip dressing several times overnight for bleeding

## 2023-05-25 NOTE — PHYSICAL EXAM
[Nasal Endoscopy Performed] : nasal endoscopy was performed, see procedure section for findings [Midline] : trachea located in midline position [Normal] : no rashes [de-identified] : nasal crusting

## 2023-05-30 ENCOUNTER — APPOINTMENT (OUTPATIENT)
Dept: OTOLARYNGOLOGY | Facility: CLINIC | Age: 38
End: 2023-05-30
Payer: COMMERCIAL

## 2023-05-30 VITALS
HEIGHT: 63 IN | SYSTOLIC BLOOD PRESSURE: 131 MMHG | TEMPERATURE: 97.9 F | DIASTOLIC BLOOD PRESSURE: 87 MMHG | WEIGHT: 153 LBS | BODY MASS INDEX: 27.11 KG/M2 | HEART RATE: 77 BPM

## 2023-05-30 PROBLEM — D64.9 ANEMIA, UNSPECIFIED: Chronic | Status: ACTIVE | Noted: 2023-05-16

## 2023-05-30 PROBLEM — E78.5 HYPERLIPIDEMIA, UNSPECIFIED: Chronic | Status: ACTIVE | Noted: 2023-05-16

## 2023-05-30 PROBLEM — K21.9 GASTRO-ESOPHAGEAL REFLUX DISEASE WITHOUT ESOPHAGITIS: Chronic | Status: ACTIVE | Noted: 2023-05-16

## 2023-05-30 PROBLEM — J45.909 UNSPECIFIED ASTHMA, UNCOMPLICATED: Chronic | Status: ACTIVE | Noted: 2023-05-16

## 2023-05-30 PROBLEM — J32.0 CHRONIC MAXILLARY SINUSITIS: Chronic | Status: ACTIVE | Noted: 2023-05-16

## 2023-05-30 PROCEDURE — 99024 POSTOP FOLLOW-UP VISIT: CPT

## 2023-05-30 PROCEDURE — 31237 NSL/SINS NDSC SURG BX POLYPC: CPT | Mod: 50,58

## 2023-05-30 NOTE — PHYSICAL EXAM
[Nasal Endoscopy Performed] : nasal endoscopy was performed, see procedure section for findings [Midline] : trachea located in midline position [Normal] : no rashes [de-identified] : nasal crusting

## 2023-05-30 NOTE — HISTORY OF PRESENT ILLNESS
[de-identified] : PAtient is 6 days s/p septoplasty and is doing well overall. SHe finished her antibiotics and had mild dried blood coming out. SHe has been doing her saline rinses with moderate benefit but still feels dryness throughout the nose bilaterally/ She has some numbness of the outisde of the nose but denies pain

## 2023-06-05 ENCOUNTER — APPOINTMENT (OUTPATIENT)
Dept: OTOLARYNGOLOGY | Facility: CLINIC | Age: 38
End: 2023-06-05
Payer: COMMERCIAL

## 2023-06-05 VITALS
BODY MASS INDEX: 27.11 KG/M2 | SYSTOLIC BLOOD PRESSURE: 131 MMHG | WEIGHT: 153 LBS | HEART RATE: 80 BPM | HEIGHT: 63 IN | TEMPERATURE: 98.1 F | DIASTOLIC BLOOD PRESSURE: 89 MMHG

## 2023-06-05 PROCEDURE — 99024 POSTOP FOLLOW-UP VISIT: CPT

## 2023-06-05 PROCEDURE — 31237 NSL/SINS NDSC SURG BX POLYPC: CPT | Mod: 50,58,79

## 2023-06-05 NOTE — PHYSICAL EXAM
[Nasal Endoscopy Performed] : nasal endoscopy was performed, see procedure section for findings [Midline] : trachea located in midline position [Normal] : no rashes [de-identified] : nasal crusting

## 2023-06-05 NOTE — ASSESSMENT
[FreeTextEntry1] : Patient follows up endoscopically debrided status post sinus surgery significant amount of debris she will follow-up and see us in 1 month she is already breathing tremendously better.

## 2023-06-05 NOTE — REASON FOR VISIT
[Post-Operative Visit] : a post-operative visit [FreeTextEntry2] : 2 weeks s/p septoplasty and FESS

## 2023-06-05 NOTE — HISTORY OF PRESENT ILLNESS
[de-identified] : Patient is about 2 weeks s/p septoplasty and FESS and is doing well overall. She does not have any facial pressure or pain. She is blowing out a lot of thick mucus and had a large scab come out the other day. She had some mild bleeding with nose blowing last week but nothing since.

## 2023-06-06 ENCOUNTER — NON-APPOINTMENT (OUTPATIENT)
Age: 38
End: 2023-06-06

## 2023-06-06 LAB — SURGICAL PATHOLOGY STUDY: SIGNIFICANT CHANGE UP

## 2023-07-06 ENCOUNTER — APPOINTMENT (OUTPATIENT)
Dept: OTOLARYNGOLOGY | Facility: CLINIC | Age: 38
End: 2023-07-06
Payer: MEDICAID

## 2023-07-06 VITALS
DIASTOLIC BLOOD PRESSURE: 86 MMHG | HEIGHT: 63 IN | SYSTOLIC BLOOD PRESSURE: 125 MMHG | TEMPERATURE: 97.6 F | BODY MASS INDEX: 27.11 KG/M2 | HEART RATE: 95 BPM | WEIGHT: 153 LBS

## 2023-07-06 DIAGNOSIS — J34.89 OTHER SPECIFIED DISORDERS OF NOSE AND NASAL SINUSES: ICD-10-CM

## 2023-07-06 DIAGNOSIS — R05.9 COUGH, UNSPECIFIED: ICD-10-CM

## 2023-07-06 DIAGNOSIS — Z98.890 OTHER SPECIFIED POSTPROCEDURAL STATES: ICD-10-CM

## 2023-07-06 PROCEDURE — 99024 POSTOP FOLLOW-UP VISIT: CPT

## 2023-07-06 PROCEDURE — 31231 NASAL ENDOSCOPY DX: CPT | Mod: 58

## 2023-07-06 NOTE — PHYSICAL EXAM
[Nasal Endoscopy Performed] : nasal endoscopy was performed, see procedure section for findings [Midline] : trachea located in midline position [Normal] : no rashes [de-identified] : nasal crusting

## 2023-07-06 NOTE — HISTORY OF PRESENT ILLNESS
[de-identified] : Patient is about 6 weeks  s/p septoplasty and FESS> SHe is still having a cough that is occasionally productive. SHe has been using nasogel that helps. SHe has not had any nosebleeds and has not been blowing out any mucus. She was given antibiotics and steroids back in February that she never took for coughing back then, and still has them at home- wondering if she should take them now for the coughing.

## 2023-07-06 NOTE — REVIEW OF SYSTEMS
[As Noted in HPI] : as noted in HPI [Nasal Congestion] : nasal congestion [Cough] : cough [Negative] : Heme/Lymph

## 2023-07-06 NOTE — ASSESSMENT
[FreeTextEntry1] : Patient 6 weeks status post sinus surgery doing really well feeling much better has a persistent cough endoscopically no pus or purulence the sinuses look quite open put her on a Medrol Dosepak that should help no antibiotics because this is not a infection I fully expect this to help her with her cough she will follow-up and see us in several months.

## 2023-07-12 ENCOUNTER — APPOINTMENT (OUTPATIENT)
Dept: ENDOCRINOLOGY | Facility: CLINIC | Age: 38
End: 2023-07-12
Payer: MEDICAID

## 2023-07-12 VITALS
OXYGEN SATURATION: 96 % | DIASTOLIC BLOOD PRESSURE: 82 MMHG | SYSTOLIC BLOOD PRESSURE: 110 MMHG | HEART RATE: 75 BPM | WEIGHT: 155 LBS | HEIGHT: 63 IN | BODY MASS INDEX: 27.46 KG/M2

## 2023-07-12 DIAGNOSIS — Z00.00 ENCOUNTER FOR GENERAL ADULT MEDICAL EXAMINATION W/OUT ABNORMAL FINDINGS: ICD-10-CM

## 2023-07-12 PROCEDURE — 99215 OFFICE O/P EST HI 40 MIN: CPT

## 2023-07-12 NOTE — REVIEW OF SYSTEMS
[Fatigue] : no fatigue [Decreased Appetite] : appetite not decreased [Recent Weight Gain (___ Lbs)] : no recent weight gain [Recent Weight Loss (___ Lbs)] : no recent weight loss [Visual Field Defect] : no visual field defect [Dry Eyes] : no dryness [Dysphagia] : no dysphagia [Dysphonia] : no dysphonia [Chest Pain] : no chest pain [Palpitations] : no palpitations [Shortness Of Breath] : no shortness of breath [Nausea] : no nausea [Vomiting] : no vomiting [Polyuria] : no polyuria [Irregular Menses] : regular menses [Joint Pain] : no joint pain [Muscle Weakness] : no muscle weakness [Acanthosis] : no acanthosis  [Acne] : no acne [Headaches] : no headaches [Dizziness] : no dizziness [Tremors] : no tremors [Pain/Numbness of Digits] : no pain/numbness of digits [Depression] : no depression [Polydipsia] : no polydipsia [Cold Intolerance] : no cold intolerance [Easy Bleeding] : no ~M tendency for easy bleeding [Easy Bruising] : no tendency for easy bruising

## 2023-07-12 NOTE — PHYSICAL EXAM
[Alert] : alert [Well Nourished] : well nourished [No Acute Distress] : no acute distress [Normal Sclera/Conjunctiva] : normal sclera/conjunctiva [PERRL] : pupils equal, round and reactive to light [Normal Outer Ear/Nose] : the ears and nose were normal in appearance [Normal TMs] : both tympanic membranes were normal [Supple] : the neck was supple [Well Healed Scar] : well healed scar [No Respiratory Distress] : no respiratory distress [Clear to Auscultation] : lungs were clear to auscultation bilaterally [Normal S1, S2] : normal S1 and S2 [Normal Rate] : heart rate was normal [Regular Rhythm] : with a regular rhythm [Normal Bowel Sounds] : normal bowel sounds [Not Tender] : non-tender [Soft] : abdomen soft [Normal Gait] : normal gait [No Clubbing, Cyanosis] : no clubbing  or cyanosis of the fingernails [No Joint Swelling] : no joint swelling seen [No Rash] : no rash [No Skin Lesions] : no skin lesions [No Motor Deficits] : the motor exam was normal [Normal Reflexes] : deep tendon reflexes were 2+ and symmetric [No Tremors] : no tremors [Oriented x3] : oriented to person, place, and time [Normal Affect] : the affect was normal [Normal Insight/Judgement] : insight and judgment were intact

## 2023-07-12 NOTE — ASSESSMENT
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 She was treated with surgery Dr. Vuong. Her whole body scan done in June 2016 showed 0 percent uptake and TG was 0.2\par \par Thyroid Cancer \par -Synthroid 112mcg 7 days a week \par -her TG antibody and Tg have been negative; her thyroid US in 2022 shows a level 2 cervical LN, small residual tissue\par -excellent response biochemically \par -Check TFTs and Tg \par -US in 01/2023 is ALFIE \par \par (No immediate plans for pregnancy)\par \par \par HLD\par see Dr. Glaser d/w with pt \par TG in 700s in dec 2021- now on fibrate low fat diet \par Will follow up with Dr Glaser \par \par Pre-DM \par -Check HgA1C \par -Discussed healthy lifestyle which incorporates healthy diet and exercise \par \par \par \par -Follow up in 6 months \par

## 2023-10-05 ENCOUNTER — LABORATORY RESULT (OUTPATIENT)
Age: 38
End: 2023-10-05

## 2023-10-05 ENCOUNTER — OUTPATIENT (OUTPATIENT)
Dept: OUTPATIENT SERVICES | Facility: HOSPITAL | Age: 38
LOS: 1 days | End: 2023-10-05
Payer: MEDICAID

## 2023-10-05 ENCOUNTER — APPOINTMENT (OUTPATIENT)
Dept: OTOLARYNGOLOGY | Facility: CLINIC | Age: 38
End: 2023-10-05

## 2023-10-05 ENCOUNTER — APPOINTMENT (OUTPATIENT)
Dept: OBGYN | Facility: CLINIC | Age: 38
End: 2023-10-05
Payer: MEDICAID

## 2023-10-05 ENCOUNTER — RESULT REVIEW (OUTPATIENT)
Age: 38
End: 2023-10-05

## 2023-10-05 VITALS — BODY MASS INDEX: 26.93 KG/M2 | WEIGHT: 152 LBS | SYSTOLIC BLOOD PRESSURE: 116 MMHG | DIASTOLIC BLOOD PRESSURE: 70 MMHG

## 2023-10-05 DIAGNOSIS — E89.0 POSTPROCEDURAL HYPOTHYROIDISM: Chronic | ICD-10-CM

## 2023-10-05 DIAGNOSIS — N92.3 OVULATION BLEEDING: ICD-10-CM

## 2023-10-05 DIAGNOSIS — Z64.0 PROBLEMS RELATED TO UNWANTED PREGNANCY: Chronic | ICD-10-CM

## 2023-10-05 DIAGNOSIS — N76.0 ACUTE VAGINITIS: ICD-10-CM

## 2023-10-05 DIAGNOSIS — Z98.890 OTHER SPECIFIED POSTPROCEDURAL STATES: Chronic | ICD-10-CM

## 2023-10-05 DIAGNOSIS — Z98.89 OTHER SPECIFIED POSTPROCEDURAL STATES: Chronic | ICD-10-CM

## 2023-10-05 DIAGNOSIS — R87.611 ATYPICAL SQUAMOUS CELLS CANNOT EXCLUDE HIGH GRADE SQUAMOUS INTRAEPITHELIAL LESION ON CYTOLOGIC SMEAR OF CERVIX (ASC-H): ICD-10-CM

## 2023-10-05 LAB
BILIRUB UR QL STRIP: NORMAL
CLARITY UR: CLEAR
COLLECTION METHOD: NORMAL
GLUCOSE UR-MCNC: NORMAL
HCG UR QL: 0.2 EU/DL
HGB UR QL STRIP.AUTO: NORMAL
KETONES UR-MCNC: NORMAL
LEUKOCYTE ESTERASE UR QL STRIP: NORMAL
NITRITE UR QL STRIP: NORMAL
PH UR STRIP: 6
PROT UR STRIP-MCNC: NORMAL
SP GR UR STRIP: 1.02

## 2023-10-05 PROCEDURE — 81002 URINALYSIS NONAUTO W/O SCOPE: CPT

## 2023-10-05 PROCEDURE — 88175 CYTOPATH C/V AUTO FLUID REDO: CPT

## 2023-10-05 PROCEDURE — G0463: CPT

## 2023-10-05 PROCEDURE — 87491 CHLMYD TRACH DNA AMP PROBE: CPT

## 2023-10-05 PROCEDURE — 87591 N.GONORRHOEAE DNA AMP PROB: CPT

## 2023-10-05 PROCEDURE — 88141 CYTOPATH C/V INTERPRET: CPT

## 2023-10-05 PROCEDURE — 87624 HPV HI-RISK TYP POOLED RSLT: CPT

## 2023-10-05 PROCEDURE — 99385 PREV VISIT NEW AGE 18-39: CPT | Mod: 25

## 2023-10-06 LAB
C TRACH RRNA SPEC QL NAA+PROBE: SIGNIFICANT CHANGE UP
HPV HIGH+LOW RISK DNA PNL CVX: SIGNIFICANT CHANGE UP
N GONORRHOEA RRNA SPEC QL NAA+PROBE: SIGNIFICANT CHANGE UP
SPECIMEN SOURCE: SIGNIFICANT CHANGE UP

## 2023-10-09 LAB
ALBUMIN SERPL ELPH-MCNC: 4.6 G/DL
ALP BLD-CCNC: 55 U/L
ALT SERPL-CCNC: 23 U/L
ANION GAP SERPL CALC-SCNC: 10 MMOL/L
AST SERPL-CCNC: 23 U/L
BILIRUB SERPL-MCNC: 0.4 MG/DL
BUN SERPL-MCNC: 16 MG/DL
CALCIUM SERPL-MCNC: 9.3 MG/DL
CHLORIDE SERPL-SCNC: 103 MMOL/L
CHOLEST SERPL-MCNC: 262 MG/DL
CO2 SERPL-SCNC: 25 MMOL/L
CREAT SERPL-MCNC: 0.85 MG/DL
CREAT SPEC-SCNC: 159 MG/DL
EGFR: 89 ML/MIN/1.73M2
ESTIMATED AVERAGE GLUCOSE: 120 MG/DL
GLUCOSE SERPL-MCNC: 93 MG/DL
HBA1C MFR BLD HPLC: 5.8 %
HDLC SERPL-MCNC: 39 MG/DL
LDLC SERPL CALC-MCNC: 184 MG/DL
MICROALBUMIN 24H UR DL<=1MG/L-MCNC: <1.2 MG/DL
MICROALBUMIN/CREAT 24H UR-RTO: NORMAL MG/G
NONHDLC SERPL-MCNC: 223 MG/DL
POTASSIUM SERPL-SCNC: 4.7 MMOL/L
PROT SERPL-MCNC: 7.5 G/DL
SODIUM SERPL-SCNC: 138 MMOL/L
T4 FREE SERPL-MCNC: 1.8 NG/DL
TRIGL SERPL-MCNC: 204 MG/DL
TSH SERPL-ACNC: 0.71 UIU/ML

## 2023-10-10 LAB — CYTOLOGY SPEC DOC CYTO: SIGNIFICANT CHANGE UP

## 2023-10-11 ENCOUNTER — RESULT REVIEW (OUTPATIENT)
Age: 38
End: 2023-10-11

## 2023-10-11 ENCOUNTER — APPOINTMENT (OUTPATIENT)
Dept: ULTRASOUND IMAGING | Facility: CLINIC | Age: 38
End: 2023-10-11
Payer: MEDICAID

## 2023-10-11 PROCEDURE — 76856 US EXAM PELVIC COMPLETE: CPT

## 2023-10-11 PROCEDURE — 76830 TRANSVAGINAL US NON-OB: CPT

## 2023-10-13 DIAGNOSIS — Z00.00 ENCOUNTER FOR GENERAL ADULT MEDICAL EXAMINATION WITHOUT ABNORMAL FINDINGS: ICD-10-CM

## 2023-10-13 DIAGNOSIS — N92.3 OVULATION BLEEDING: ICD-10-CM

## 2023-10-13 DIAGNOSIS — R87.611 ATYPICAL SQUAMOUS CELLS CANNOT EXCLUDE HIGH GRADE SQUAMOUS INTRAEPITHELIAL LESION ON CYTOLOGIC SMEAR OF CERVIX (ASC-H): ICD-10-CM

## 2023-10-13 DIAGNOSIS — Z01.419 ENCOUNTER FOR GYNECOLOGICAL EXAMINATION (GENERAL) (ROUTINE) WITHOUT ABNORMAL FINDINGS: ICD-10-CM

## 2023-10-16 ENCOUNTER — APPOINTMENT (OUTPATIENT)
Dept: CARDIOLOGY | Facility: CLINIC | Age: 38
End: 2023-10-16
Payer: MEDICAID

## 2023-10-16 VITALS
BODY MASS INDEX: 27.29 KG/M2 | OXYGEN SATURATION: 98 % | RESPIRATION RATE: 16 BRPM | TEMPERATURE: 97.9 F | HEIGHT: 63 IN | SYSTOLIC BLOOD PRESSURE: 122 MMHG | HEART RATE: 90 BPM | DIASTOLIC BLOOD PRESSURE: 78 MMHG | WEIGHT: 154 LBS

## 2023-10-16 PROCEDURE — 99214 OFFICE O/P EST MOD 30 MIN: CPT

## 2023-11-21 RX ORDER — AMOXICILLIN AND CLAVULANATE POTASSIUM 875; 125 MG/1; MG/1
875-125 TABLET, COATED ORAL
Qty: 14 | Refills: 1 | Status: DISCONTINUED | COMMUNITY
Start: 2023-03-14 | End: 2023-10-13

## 2023-11-21 RX ORDER — AZELASTINE HYDROCHLORIDE 137 UG/1
137 SPRAY, METERED NASAL DAILY
Qty: 3 | Refills: 3 | Status: DISCONTINUED | COMMUNITY
Start: 2022-02-28 | End: 2023-10-13

## 2023-11-21 RX ORDER — METHYLPREDNISOLONE 4 MG/1
4 TABLET ORAL
Qty: 1 | Refills: 0 | Status: DISCONTINUED | COMMUNITY
Start: 2023-03-27 | End: 2023-10-13

## 2023-11-21 RX ORDER — LIOTHYRONINE SODIUM 5 UG/1
5 TABLET ORAL TWICE DAILY
Qty: 90 | Refills: 1 | Status: DISCONTINUED | COMMUNITY
Start: 2021-04-23 | End: 2023-10-13

## 2023-11-21 RX ORDER — AZITHROMYCIN 250 MG/1
250 TABLET, FILM COATED ORAL
Qty: 1 | Refills: 0 | Status: DISCONTINUED | COMMUNITY
Start: 2023-05-24 | End: 2023-10-13

## 2023-11-28 ENCOUNTER — APPOINTMENT (OUTPATIENT)
Dept: OTOLARYNGOLOGY | Facility: CLINIC | Age: 38
End: 2023-11-28
Payer: MEDICAID

## 2023-11-28 VITALS
SYSTOLIC BLOOD PRESSURE: 136 MMHG | HEART RATE: 81 BPM | HEIGHT: 63 IN | TEMPERATURE: 98 F | BODY MASS INDEX: 27.29 KG/M2 | WEIGHT: 154 LBS | DIASTOLIC BLOOD PRESSURE: 82 MMHG

## 2023-11-28 DIAGNOSIS — R09.81 NASAL CONGESTION: ICD-10-CM

## 2023-11-28 DIAGNOSIS — J30.2 OTHER SEASONAL ALLERGIC RHINITIS: ICD-10-CM

## 2023-11-28 PROCEDURE — 99214 OFFICE O/P EST MOD 30 MIN: CPT | Mod: 25

## 2023-11-28 PROCEDURE — 95004 PERQ TESTS W/ALRGNC XTRCS: CPT

## 2023-11-28 PROCEDURE — 31231 NASAL ENDOSCOPY DX: CPT

## 2023-12-21 ENCOUNTER — APPOINTMENT (OUTPATIENT)
Dept: DERMATOLOGY | Facility: CLINIC | Age: 38
End: 2023-12-21

## 2024-01-02 ENCOUNTER — LABORATORY RESULT (OUTPATIENT)
Age: 39
End: 2024-01-02

## 2024-01-02 ENCOUNTER — APPOINTMENT (OUTPATIENT)
Dept: CARDIOLOGY | Facility: CLINIC | Age: 39
End: 2024-01-02
Payer: MEDICAID

## 2024-01-02 VITALS
OXYGEN SATURATION: 98 % | TEMPERATURE: 98.4 F | RESPIRATION RATE: 16 BRPM | BODY MASS INDEX: 27.46 KG/M2 | HEIGHT: 63 IN | SYSTOLIC BLOOD PRESSURE: 120 MMHG | HEART RATE: 79 BPM | WEIGHT: 155 LBS | DIASTOLIC BLOOD PRESSURE: 70 MMHG

## 2024-01-02 PROCEDURE — 99214 OFFICE O/P EST MOD 30 MIN: CPT

## 2024-01-02 RX ORDER — FENOFIBRATE 145 MG/1
145 TABLET, COATED ORAL
Qty: 90 | Refills: 0 | Status: COMPLETED | COMMUNITY
Start: 2021-12-10 | End: 2024-01-02

## 2024-01-02 NOTE — PHYSICAL EXAM
[Well Developed] : well developed [Well Nourished] : well nourished [No Acute Distress] : no acute distress [Normal Venous Pressure] : normal venous pressure [No Carotid Bruit] : no carotid bruit [Normal S1, S2] : normal S1, S2 [No Rub] : no rub [No Gallop] : no gallop [I] : a grade 1 [Clear Lung Fields] : clear lung fields [Good Air Entry] : good air entry [No Respiratory Distress] : no respiratory distress  [Soft] : abdomen soft [Non Tender] : non-tender [No Masses/organomegaly] : no masses/organomegaly [Normal Bowel Sounds] : normal bowel sounds [Normal Gait] : normal gait [No Edema] : no edema [No Cyanosis] : no cyanosis [No Clubbing] : no clubbing [No Varicosities] : no varicosities [No Rash] : no rash [No Skin Lesions] : no skin lesions [Moves all extremities] : moves all extremities [No Focal Deficits] : no focal deficits [Normal Speech] : normal speech [Alert and Oriented] : alert and oriented [Normal memory] : normal memory [Normal Appearance] : normal appearance [General Appearance - Well Developed] : well developed [Well Groomed] : well groomed [General Appearance - Well Nourished] : well nourished [No Deformities] : no deformities [General Appearance - In No Acute Distress] : no acute distress [Normal Conjunctiva] : the conjunctiva exhibited no abnormalities [Eyelids - No Xanthelasma] : the eyelids demonstrated no xanthelasmas [Normal Oral Mucosa] : normal oral mucosa [Normal Jugular Venous A Waves Present] : normal jugular venous A waves present [Normal Jugular Venous V Waves Present] : normal jugular venous V waves present [No Jugular Venous Concepcion A Waves] : no jugular venous concepcion A waves [5th Left ICS - MCL] : palpated at the 5th LICS in the midclavicular line [Normal] : normal [No Precordial Heave] : no precordial heave was noted [Normal Rate] : normal [Rhythm Regular] : regular [Normal S1] : normal S1 [Normal S2] : normal S2 [No Murmur] : no murmurs heard [2+] : left 2+ [No Abnormalities] : the abdominal aorta was not enlarged and no bruit was heard [No Pitting Edema] : no pitting edema present [] : no respiratory distress [Respiration, Rhythm And Depth] : normal respiratory rhythm and effort [Exaggerated Use Of Accessory Muscles For Inspiration] : no accessory muscle use [Auscultation Breath Sounds / Voice Sounds] : lungs were clear to auscultation bilaterally [Abdomen Soft] : soft [Abnormal Walk] : normal gait [Gait - Sufficient For Exercise Testing] : the gait was sufficient for exercise testing [Cyanosis, Localized] : no localized cyanosis [Skin Color & Pigmentation] : normal skin color and pigmentation [Skin Turgor] : normal skin turgor [No Xanthoma] : no  xanthoma was observed [Oriented To Time, Place, And Person] : oriented to person, place, and time [Affect] : the affect was normal [Mood] : the mood was normal [No Anxiety] : not feeling anxious [Right Femoral Bruit] : no bruit heard over the right femoral artery [Left Femoral Bruit] : no bruit heard over the left femoral artery [S3] : no S3 [S4] : no S4 [Right Carotid Bruit] : no bruit heard over the right carotid [Left Carotid Bruit] : no bruit heard over the left carotid

## 2024-01-02 NOTE — REASON FOR VISIT
[CV Risk Factors and Non-Cardiac Disease] : CV risk factors and non-cardiac disease [Consultation] : a consultation regarding [Hyperlipidemia] : hyperlipidemia [FreeTextEntry3] : Dr. Lynn Nevarez [FreeTextEntry1] : This is a 38-year-old female with past medical history significant for hypertriglyceridemia, hyperlipidemia, hypothyroidism, reactive airway disease, thyroid cancer (s/o thyroidectomy 2015), s/p COVID May [id number] who comes in for lipid evaluation.  She was given a call from Dr. Ware's office on 3/2/23 where they told her that her cholesterol levels were abnormal and needed lipid follow-up evaluation.   She is overall doing well today and denies chest pain, shortness of breath, dizziness or syncope. She is currently on Fenofibrate 145 mg for triglycerides. She has allergies to oxycodone and a smoking history of 4 cigs/day for 5 years but stopped 10 years ago. Her father had a heart attack at age 56 and diabetes. Her mother and sister has high cholesterol, high triglycerides, and diabetes.   On 1/30/23, her cholesterol was 231, triglyceride 192, HDL 46, , A1c 5.4 On 7/8/22, her cholesterol was 220, triglyceride 226, HDL 39, , non-, A1c 5.8 On 1/27/22, her cholesterol was 244, triglyceride 269, HDL 41, , non- On 12/7/21, her cholesterol was 218, triglyceride 707, HDL 28, LDL 76, non- [FreeTextEntry2] : Lipid referral

## 2024-01-02 NOTE — ASSESSMENT
[FreeTextEntry1] : This is a 38-year-old female with past medical history significant for thyroid cancer (s/p thyroidectomy 2015), hypothyroidism, hypertriglyceridemia, s/p COVID-19 infection in May 2022, reactive airway disease, who comes in for lipid follow-up evaluation.  She was born in [facility] and has no history of rheumatic fever.   She does not drink excessive caffeine or alcohol.  Cardiac risk factors include hyperlipidemia, positive family history of atherosclerosis, (father had myocardial infarction age 56 as well as diabetes), history of smoking history of 4 cigs/day for 5 years (stopped 10 years ago).   Family History: Her mother and sister have high cholesterol, high triglycerides, and diabetes.   HPI: She is feeling generally well today and denies chest pain, dizziness, heart palpitations, recent episodes of syncope or falls, SOB, or dyspnea at this time.  Current Medications: Rosuvastatin 20 mg daily   She reports stopping her Fenofibrate 145 mg daily after beginning Rosuvastatin 20 mg daily at her last appointment.   PMH: Her triglycerides have been as high as 707 mg/dL. The fenofibrate has been controlling her triglycerides adequately.  She was ordered for a CT calcium score at her last appointment but has not completed this yet.    BLOOD PRESSURE: Controlled.    BLOODWORK:  *LDL target goal < 100* -New blood work was done 01/02/2024 to evaluate lipid profile, CBC, BMP, hepatic function, A1C and TSH. -Lipid panel done October 5, 2023 demonstrated cholesterol 262, HDL 39, LDL calculated 184, non-HDL cholesterol 223, triglycerides 204 mg/dL.  Hemoglobin A1c 5.8 -The patient had a lipid panel done January 30, 2023 which demonstrated cholesterol 231, triglycerides 192, HDL 46, LDL of 150 mg/dL, hemoglobin A1c of 5.4. -On 7/8/22, her cholesterol was 220, triglyceride 226, HDL 39, , non-, A1c 5.8 -On 1/27/22, her cholesterol was 244, triglyceride 269, HDL 41, , non- -On 12/7/21, her cholesterol was 218, triglyceride 707, HDL 28, LDL 76, non-   TESTING/REPORTS: -EKG done October 2020 demonstrated normal sinus rhythm rate 71 bpm otherwise unremarkable.    PLAN: -She will continue her current medications and contact the office if problems arise before next follow-up appointment. -She will complete a CT calcium score to evaluate the presence of early atherosclerotic disease.  -She will follow-up with her primary care doctor routinely.    I have discussed the plan of care with BING CASTRO and she will follow up in 3 months. They are compliant with all of their medications.   The patient understands that aerobic exercises must be increased to 40 minutes 4 times/week and a detailed discussion of lifestyle modification was done today.   The patient has a good understanding of the diagnosis, treatment plan and lifestyle modification.   They will contact me at the office for any questions with their care or any changes in their health status.   The patient was discussed with supervision physician Dr. Neptali Glaser at the time of the visit and the plan of care will be carried out as noted above.     BILL [place] NP

## 2024-01-02 NOTE — DISCUSSION/SUMMARY
[FreeTextEntry1] : Dr. Glaser-(PRIOR VISIT and PMH WITH Dr. Glaser):  This is a 38-year-old female with past medical history significant for thyroid cancer, hypertriglyceridemia, status post COVID-19 infection in May 2022, hypothyroidism, reactive airway disease, who comes in for lipid follow-up evaluation. She denies chest pain, shortness of breath, dizziness or syncope.  She was born in [place] and has no history of rheumatic fever.  She does not drink excessive caffeine or alcohol. The patient has not been able to work with our registered dietitian. Lipid panel done October 5, 2023 demonstrated cholesterol 262, HDL 39, LDL calculated 184, non-HDL cholesterol 223, triglycerides 204 mg/dL.  Hemoglobin A1c 5.8 The patient's target LDL remains less than 100 mg/dL. The patient wishes to start medical therapy.  She has no plans on becoming pregnant but is aware that if she does get pregnant, she must stop the medication immediately. She will start Crestor 20 mg daily and have follow-up blood work in 6 to 8 weeks. I would also consider discontinuing fenofibrate after reviewing blood work from Crestor therapy She is also interested in the presence of early atherosclerotic disease and we will schedule coronary artery calcium score.  The patient had a lipid panel done January 30, 2023 which demonstrated cholesterol 231, triglycerides 192, HDL 46, LDL of 150 mg/dL, hemoglobin A1c of 5.4. The patient remains on fenofibrate 145 mg daily.  If the patient were to get pregnant she should discontinue fenofibrate therapy.  I have informed the patient of this recommendation. Her 10-year ASCVD risk score is 3.9% but her lifetime risk of atherosclerotic cardiovascular disease is 39% At this point in time I would recommend lifestyle modification, dietary intervention and exercise to improve her lipid profile.  She is planning on having children and I would not recommend statin therapy at this time. Cardiac risk factors include hyperlipidemia, positive family history of atherosclerosis, (father had myocardial infarction age 56 as well as diabetes), history of smoking history of 4 cigs/day for 5 years(stopped 10 years) ago. Her mother and sister has high cholesterol, high triglycerides, and diabetes.  Her triglycerides have been as high as 707 mg/dL. The fenofibrate has been controlling her triglycerides adequately. Hopefully with lifestyle modification, she will not require additional therapy. On 1/30/23, her cholesterol was 231, triglyceride 192, HDL 46, , A1c 5.4 On 7/8/22, her cholesterol was 220, triglyceride 226, HDL 39, , non-, A1c 5.8 On 1/27/22, her cholesterol was 244, triglyceride 269, HDL 41, , non- On 12/7/21, her cholesterol was 218, triglyceride 707, HDL 28, LDL 76, non- PMH: She was diagnosed with thyroid cancer and underwent a thyroidectomy in 2015 followed by radioactive iodine twice.  She is currently on Synthroid therapy. The patient had blood work done June 8, 2020 which demonstrated cholesterol 206, triglycerides 248 mg/dL, HDL 36 mg/dL, and calculated LDL of 120 mg/dL. Blood work done through [facility] September 11, 2020 demonstrated cholesterol 223, triglycerides of 347, HDL of 33, calculated  mg/dL.  She reports that her mother had high triglycerides and is currently on fenofibrate.  The patient understands that aerobic exercises must be increased to 40 minutes 4 times per week. A detailed discussion of lifestyle modification was done today. The patient has a good understanding of the diagnosis, and treatment plan. Lifestyle modification was also outlined.

## 2024-01-04 ENCOUNTER — APPOINTMENT (OUTPATIENT)
Dept: DERMATOLOGY | Facility: CLINIC | Age: 39
End: 2024-01-04
Payer: MEDICAID

## 2024-01-04 PROCEDURE — 99204 OFFICE O/P NEW MOD 45 MIN: CPT

## 2024-01-04 RX ORDER — TRETINOIN 0.25 MG/G
0.03 CREAM TOPICAL
Qty: 1 | Refills: 4 | Status: ACTIVE | COMMUNITY
Start: 2024-01-04 | End: 1900-01-01

## 2024-01-04 NOTE — HISTORY OF PRESENT ILLNESS
[FreeTextEntry1] : evaluation of skin lesions  [de-identified] : Here for evaluation of skin moles  also gets dry patches on forehead, face - using otc retinol and other moisturizers would like refill of tretinoin for acne/pores wants to discuss tx for wrinkles   Personal hx: no history of skin cancer  Family Hx: no family history of skin cancer

## 2024-01-04 NOTE — ASSESSMENT
[FreeTextEntry1] : Face rash - New diagnosis, uncertain clinical course - favor xerosis vs seborrheic dermatitis - continue emollients, minimize retinoids - trial - ketoconazole 2% shampoo - leave on scalp for 10-15 mins and then wash off 2-3x/week  Nevi, trunk and extremities - benign, reassurance, no intervention needed unless irritated - TBSE performed today - no concerning findings  - TBSE every few years with dermatologist - Photoprotection reviewed including sun-protective behaviors, protective clothing, and the use of OTC broad-spectrum SPF 30+ sunscreens was advised - RTC if develops lesions that are new, symptomatic (bleeding/itching), changing in size/color/shape  Acne vulgaris, chronic, mild  - tretinoin 0.025% cream - pea sized amount diffusely over face at night. Start 2 nights a week for 1st two weeks and increase gradually as tolerated to minimize side effects of dryness, irritation.

## 2024-01-11 ENCOUNTER — APPOINTMENT (OUTPATIENT)
Dept: ULTRASOUND IMAGING | Facility: CLINIC | Age: 39
End: 2024-01-11
Payer: MEDICAID

## 2024-01-11 PROCEDURE — 58340 CATHETER FOR HYSTEROGRAPHY: CPT

## 2024-01-11 PROCEDURE — 76831 ECHO EXAM UTERUS: CPT

## 2024-01-19 ENCOUNTER — APPOINTMENT (OUTPATIENT)
Dept: ENDOCRINOLOGY | Facility: CLINIC | Age: 39
End: 2024-01-19
Payer: MEDICAID

## 2024-01-19 VITALS
DIASTOLIC BLOOD PRESSURE: 80 MMHG | HEART RATE: 71 BPM | HEIGHT: 63 IN | BODY MASS INDEX: 26.93 KG/M2 | WEIGHT: 152 LBS | SYSTOLIC BLOOD PRESSURE: 124 MMHG | OXYGEN SATURATION: 96 %

## 2024-01-19 DIAGNOSIS — E78.1 PURE HYPERGLYCERIDEMIA: ICD-10-CM

## 2024-01-19 DIAGNOSIS — Z01.419 ENCOUNTER FOR GYNECOLOGICAL EXAMINATION (GENERAL) (ROUTINE) W/OUT ABNORMAL FINDINGS: ICD-10-CM

## 2024-01-19 PROCEDURE — 99215 OFFICE O/P EST HI 40 MIN: CPT

## 2024-01-19 PROCEDURE — G2211 COMPLEX E/M VISIT ADD ON: CPT

## 2024-01-19 NOTE — END OF VISIT
[FreeTextEntry3] :  TIME SPENT: educating patient on DM, HLD, diet, and reviewing thyroid cancer goals and treatment options. 40 minutes reviewing pt history and performing a physical examination.  discussing treatment options, writing prescriptions and 10 min writing her note  [Time Spent: ___ minutes] : I have spent [unfilled] minutes of time on the encounter.

## 2024-01-19 NOTE — ASSESSMENT
[Long Term Vascular Complications] : long term vascular complications of diabetes [Importance of Diet and Exercise] : importance of diet and exercise to improve glycemic control, achieve weight loss and improve cardiovascular health [Levothyroxine] : The patient was instructed to take Levothyroxine on an empty stomach, separate from vitamins, and wait at least 30 minutes before eating [FreeTextEntry1] : 38 year old female here for a followup pt visit with me for thyroid cancer HLD preDM high TG hx of low Vitamin D  used to follow with Dr. 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 She was treated with surgery Dr. Vuong. Her whole body scan done in June 2016 showed 0 percent uptake and TG was 0.2   1. thyroid cancer Synthroid 112mcg 7 days a week, no longer on cytomel  her TG antibody and Tg have been negative; her thyroid US in 2023 shows a level 2 cervical LNs - these are deemed beingin  TG on New assay and TG antibody negative in 2023  excellent response biochemically  plan: was to do US in July 2023 to fu LN - overdue now  order placed and d/w pt  TSH is at goal <1. 0.96 - d/w pt that level is well controlled, on cytomel in past pt continued to have fatigue. d/w faitgue is likely multifactoral from lifestyle, sleep habits, stress etc and not only thyroid related to fu with pcp also given hx of anemia as it relates to fatigue and fu cbc and hgb etc  we will get US now as TG done in oct  april labs for TG  US now  *no active plans for pregnancy not sexually active     2. HLD pt with HLD in past healthy eating exercise  see Dr. Glaser d/w with pt  TG in 700s in dec 2021- was on fibrate low fat diet needs to followup with Dr. Glaser, now on statin but stopped firbate dw pt if no current plans for pregnancy to consider restarting fibrate as TG were still in 200s also d/w pt to monitor Etoh use as this will increase TG and also be mindful on vacation in [place] for carbs and pastas etc as this can raise TG levels  will d/w Dr. Glaser - she will call ofice regarding firbate   3. preDM  a1c jan 2024 5.5 - monitor A1c  carb consisten diet, monitor portions, limit sweets  sweet craving at night - than instead eat a piece of fruit with yogurt etc  -monitor diet  discussed healthy lifestyle which incorporates healthy diet and exercise   4. low D  takes intermittent vitamin D once a week 10.000 units will check levels d/w pt important to know levels for correct dosing      5. HCM  check D levels  please note : no active current plans for pregnancy- d/w pt when planning to be off cytomel and fenofibrate when actively trying and considering pregnancy in the future  also d.w pt TSH should be optimized prior to pregnancy and to discuss with me when they will be planning as TSH will need to be followed and treated as needed and to call me once preganancy confirmed to adjust dosing (will need to have increased LevoT dosing; this is especially important in the first 8 weeks when the fetus will rely on the mother for thyroid requirements) therefore dose will need to be increased by 20%)  pt to inform me and keep me updated regarding pregnancy planning also suggested when planning for pregnancy should also discuss with GYN given high TG-  given that estrogen load during pregnancy can increase the TG level             i also d.w pt improtance of seeing Dr. Glaser given her HLD and high TG levels  no current active plans for pregnancy but considering it for the future  knows to inform me of pregnancy planning (d.w pt in past visits as mediacations will need adjustments)       pt also should followup with PCP regarding ongoing medical care and followup of medical issues/concerns and exam

## 2024-01-19 NOTE — PHYSICAL EXAM
[Alert] : alert [Well Nourished] : well nourished [No Acute Distress] : no acute distress [Normal Sclera/Conjunctiva] : normal sclera/conjunctiva [EOMI] : extra ocular movement intact [PERRL] : pupils equal, round and reactive to light [Normal Outer Ear/Nose] : the ears and nose were normal in appearance [Normal Hearing] : hearing was normal [Normal TMs] : both tympanic membranes were normal [No Neck Mass] : no neck mass was observed [Supple] : the neck was supple [Thyroid Not Enlarged] : the thyroid was not enlarged [No Thyroid Nodules] : no palpable thyroid nodules [Well Healed Scar] : well healed scar [No Respiratory Distress] : no respiratory distress [No Accessory Muscle Use] : no accessory muscle use [Clear to Auscultation] : lungs were clear to auscultation bilaterally [Normal to Percussion] : lungs were normal to percussion [Normal S1, S2] : normal S1 and S2 [Normal Rate] : heart rate was normal [Regular Rhythm] : with a regular rhythm [Normal Bowel Sounds] : normal bowel sounds [Not Tender] : non-tender [Soft] : abdomen soft [No HSM] : no hepato-splenomegaly [Normal Gait] : normal gait [No Clubbing, Cyanosis] : no clubbing  or cyanosis of the fingernails [No Joint Swelling] : no joint swelling seen [No Rash] : no rash [No Skin Lesions] : no skin lesions [No Motor Deficits] : the motor exam was normal [Normal Reflexes] : deep tendon reflexes were 2+ and symmetric [No Tremors] : no tremors [Oriented x3] : oriented to person, place, and time [Normal Affect] : the affect was normal [Normal Insight/Judgement] : insight and judgment were intact [Normal Mood] : the mood was normal

## 2024-01-19 NOTE — REVIEW OF SYSTEMS
[Fatigue] : no fatigue [Decreased Appetite] : appetite not decreased [Recent Weight Gain (___ Lbs)] : no recent weight gain [Recent Weight Loss (___ Lbs)] : no recent weight loss [Visual Field Defect] : no visual field defect [Dry Eyes] : no dryness [Dysphagia] : no dysphagia [Dysphonia] : no dysphonia [Chest Pain] : no chest pain [Palpitations] : no palpitations [Shortness Of Breath] : no shortness of breath [Nausea] : no nausea [Vomiting] : no vomiting [Polyuria] : no polyuria [Irregular Menses] : regular menses [Joint Pain] : no joint pain [Muscle Weakness] : no muscle weakness [Acanthosis] : no acanthosis  [Acne] : no acne [Headaches] : no headaches [Dizziness] : no dizziness [Tremors] : no tremors [Pain/Numbness of Digits] : no pain/numbness of digits [Depression] : no depression [Polydipsia] : no polydipsia [Cold Intolerance] : no cold intolerance [Easy Bleeding] : no ~M tendency for easy bleeding [Easy Bruising] : no tendency for easy bruising [All other systems negative] : All other systems negative

## 2024-01-20 ENCOUNTER — OUTPATIENT (OUTPATIENT)
Dept: OUTPATIENT SERVICES | Facility: HOSPITAL | Age: 39
LOS: 1 days | End: 2024-01-20
Payer: MEDICAID

## 2024-01-20 ENCOUNTER — APPOINTMENT (OUTPATIENT)
Dept: ULTRASOUND IMAGING | Facility: IMAGING CENTER | Age: 39
End: 2024-01-20
Payer: MEDICAID

## 2024-01-20 DIAGNOSIS — Z98.89 OTHER SPECIFIED POSTPROCEDURAL STATES: Chronic | ICD-10-CM

## 2024-01-20 DIAGNOSIS — Z98.890 OTHER SPECIFIED POSTPROCEDURAL STATES: Chronic | ICD-10-CM

## 2024-01-20 DIAGNOSIS — C73 MALIGNANT NEOPLASM OF THYROID GLAND: ICD-10-CM

## 2024-01-20 DIAGNOSIS — E89.0 POSTPROCEDURAL HYPOTHYROIDISM: Chronic | ICD-10-CM

## 2024-01-20 DIAGNOSIS — Z64.0 PROBLEMS RELATED TO UNWANTED PREGNANCY: Chronic | ICD-10-CM

## 2024-01-20 PROCEDURE — 76536 US EXAM OF HEAD AND NECK: CPT

## 2024-01-20 PROCEDURE — 76536 US EXAM OF HEAD AND NECK: CPT | Mod: 26

## 2024-01-23 ENCOUNTER — APPOINTMENT (OUTPATIENT)
Dept: SURGERY | Facility: CLINIC | Age: 39
End: 2024-01-23

## 2024-02-15 ENCOUNTER — APPOINTMENT (OUTPATIENT)
Dept: DERMATOLOGY | Facility: CLINIC | Age: 39
End: 2024-02-15

## 2024-02-22 ENCOUNTER — OUTPATIENT (OUTPATIENT)
Dept: OUTPATIENT SERVICES | Facility: HOSPITAL | Age: 39
LOS: 1 days | End: 2024-02-22
Payer: MEDICAID

## 2024-02-22 ENCOUNTER — APPOINTMENT (OUTPATIENT)
Dept: OBGYN | Facility: CLINIC | Age: 39
End: 2024-02-22
Payer: MEDICAID

## 2024-02-22 VITALS — BODY MASS INDEX: 27.74 KG/M2 | SYSTOLIC BLOOD PRESSURE: 122 MMHG | DIASTOLIC BLOOD PRESSURE: 84 MMHG | WEIGHT: 156.6 LBS

## 2024-02-22 DIAGNOSIS — Z98.890 OTHER SPECIFIED POSTPROCEDURAL STATES: Chronic | ICD-10-CM

## 2024-02-22 DIAGNOSIS — Z98.89 OTHER SPECIFIED POSTPROCEDURAL STATES: Chronic | ICD-10-CM

## 2024-02-22 DIAGNOSIS — E89.0 POSTPROCEDURAL HYPOTHYROIDISM: Chronic | ICD-10-CM

## 2024-02-22 DIAGNOSIS — Z64.0 PROBLEMS RELATED TO UNWANTED PREGNANCY: Chronic | ICD-10-CM

## 2024-02-22 DIAGNOSIS — N84.0 POLYP OF CORPUS UTERI: ICD-10-CM

## 2024-02-22 DIAGNOSIS — N76.0 ACUTE VAGINITIS: ICD-10-CM

## 2024-02-22 PROCEDURE — G0463: CPT

## 2024-02-22 PROCEDURE — 99213 OFFICE O/P EST LOW 20 MIN: CPT | Mod: GC

## 2024-02-23 DIAGNOSIS — N84.0 POLYP OF CORPUS UTERI: ICD-10-CM

## 2024-02-23 NOTE — PLAN
[FreeTextEntry1] : 39y  presents to booking clinic to discuss management of endometrial polyp, will plan for hysteroscopic polypectomy.   - Discussed nature of procedure, risks, benefits, alternatives. Patient expresses understanding - Also discussed diagnosis of adenomyosis, possible symptoms and treatment with hysterectomy. Patient is asymptomatic at this time and expresses understanding of the diagnosis.  - Accepts blood   Seen and d/w Dr Balderas  RPatil PGY3   MIGS Fellow Addendum  Ms. Hartman presents for surgical consulation for management of endometrial polyp and adenomyosis. At this time her main complaint is AUB but does have some painful periods. She is interested in the possibility of conceiving in the future and declines treatment that would preclude this. Advised IUD/OCP/progesterone use for management of symptoms if she desires in the interim for her symptoms related to likey adenomyosis. She will consider this. We discussed managemt of polyp and importance of removal and pathology to rule out malignancy. Also discussed possibiity of recurrence. AAGL materials provided and procedure explained in detail. R/b/a of D&C hysteroscopic polypectomy discussed and she agreed. No medical clearance needed, tasked to schedule.  D/w Dr. Anant Balderas FMIGS-2/PGY-6

## 2024-02-23 NOTE — END OF VISIT
[] : Fellow [FreeTextEntry3] : I agree with the above assessment and plan. discussed risks of procedure including but not limited to VTE, SSI, uterine perforation, fluid overload, need for laparotomy, laparoscopy. reviewed recovery May Ny MD

## 2024-02-23 NOTE — PHYSICAL EXAM
[Appropriately responsive] : appropriately responsive [Alert] : alert [No Acute Distress] : no acute distress [Soft] : soft [Non-tender] : non-tender [Labia Majora] : normal [Normal] : normal [Normal Position] : in a normal position [FreeTextEntry8] : mobile uterus and grossly normal appearing cervix and vagina.

## 2024-02-23 NOTE — HISTORY OF PRESENT ILLNESS
[FreeTextEntry1] : 39y  presenting to booking clinic to discuss management of endometrial polyp found on workup of AUB. Patient has had heavy menses with intermenstrual bleeding.  TVUS (10/11/23): Uterus: 8.2 x 4.9 x 5.2 cm. Myometrium is diffusely heterogeneous with small myometrial cysts, c/w adenomyosis. No discrete fibroids. EM: 13 mm. ?Polyp 9 x 6 x 10 mm.  Bilateral ovaries wnl.    Sonohysterogram (24): There is an endometrial polyp measuring 1.1 x 0.8 cm.   OB Hx:  x1, TOP x1 s/p D&C Gyn Hx: Cervical cysts cauterized , most recent pap (10/5/23): LSIL, HPV neg, following in benign GYN clinic   PMHx: Thyroid cancer, hypothyroidism, deviated nasal septum  Meds: Levothyroxine 112 mcg, omeprazole, fenofibrate, rosuvastatin   Allergies: NKDA SHx: Total thyroidectomy, septoplasty, D&C x2

## 2024-03-01 ENCOUNTER — APPOINTMENT (OUTPATIENT)
Dept: OTOLARYNGOLOGY | Facility: CLINIC | Age: 39
End: 2024-03-01

## 2024-03-11 ENCOUNTER — RESULT REVIEW (OUTPATIENT)
Age: 39
End: 2024-03-11

## 2024-03-11 DIAGNOSIS — Z12.31 ENCOUNTER FOR SCREENING MAMMOGRAM FOR MALIGNANT NEOPLASM OF BREAST: ICD-10-CM

## 2024-03-15 LAB
CHOLEST SERPL-MCNC: 163 MG/DL
HDLC SERPL-MCNC: 41 MG/DL
LDLC SERPL CALC-MCNC: 98 MG/DL
LDLC SERPL DIRECT ASSAY-MCNC: 102 MG/DL
NONHDLC SERPL-MCNC: 122 MG/DL
TRIGL SERPL-MCNC: 133 MG/DL

## 2024-03-19 ENCOUNTER — APPOINTMENT (OUTPATIENT)
Dept: CT IMAGING | Facility: CLINIC | Age: 39
End: 2024-03-19
Payer: SELF-PAY

## 2024-03-19 ENCOUNTER — OUTPATIENT (OUTPATIENT)
Dept: OUTPATIENT SERVICES | Facility: HOSPITAL | Age: 39
LOS: 1 days | End: 2024-03-19
Payer: SELF-PAY

## 2024-03-19 DIAGNOSIS — Z98.89 OTHER SPECIFIED POSTPROCEDURAL STATES: Chronic | ICD-10-CM

## 2024-03-19 DIAGNOSIS — E78.5 HYPERLIPIDEMIA, UNSPECIFIED: ICD-10-CM

## 2024-03-19 DIAGNOSIS — Z64.0 PROBLEMS RELATED TO UNWANTED PREGNANCY: Chronic | ICD-10-CM

## 2024-03-19 DIAGNOSIS — Z98.890 OTHER SPECIFIED POSTPROCEDURAL STATES: Chronic | ICD-10-CM

## 2024-03-19 DIAGNOSIS — E89.0 POSTPROCEDURAL HYPOTHYROIDISM: Chronic | ICD-10-CM

## 2024-03-19 PROCEDURE — 75571 CT HRT W/O DYE W/CA TEST: CPT

## 2024-03-19 PROCEDURE — 75571 CT HRT W/O DYE W/CA TEST: CPT | Mod: 26

## 2024-03-29 ENCOUNTER — RESULT REVIEW (OUTPATIENT)
Age: 39
End: 2024-03-29

## 2024-03-29 ENCOUNTER — OUTPATIENT (OUTPATIENT)
Dept: OUTPATIENT SERVICES | Facility: HOSPITAL | Age: 39
LOS: 1 days | End: 2024-03-29
Payer: MEDICAID

## 2024-03-29 ENCOUNTER — APPOINTMENT (OUTPATIENT)
Dept: MAMMOGRAPHY | Facility: IMAGING CENTER | Age: 39
End: 2024-03-29
Payer: MEDICAID

## 2024-03-29 DIAGNOSIS — Z64.0 PROBLEMS RELATED TO UNWANTED PREGNANCY: Chronic | ICD-10-CM

## 2024-03-29 DIAGNOSIS — Z12.31 ENCOUNTER FOR SCREENING MAMMOGRAM FOR MALIGNANT NEOPLASM OF BREAST: ICD-10-CM

## 2024-03-29 PROCEDURE — 77066 DX MAMMO INCL CAD BI: CPT | Mod: 26

## 2024-03-29 PROCEDURE — G0279: CPT

## 2024-03-29 PROCEDURE — 77066 DX MAMMO INCL CAD BI: CPT

## 2024-03-29 PROCEDURE — G0279: CPT | Mod: 26

## 2024-04-05 ENCOUNTER — OUTPATIENT (OUTPATIENT)
Dept: OUTPATIENT SERVICES | Facility: HOSPITAL | Age: 39
LOS: 1 days | End: 2024-04-05
Payer: MEDICAID

## 2024-04-05 VITALS
HEART RATE: 88 BPM | OXYGEN SATURATION: 98 % | HEIGHT: 62 IN | RESPIRATION RATE: 14 BRPM | DIASTOLIC BLOOD PRESSURE: 83 MMHG | TEMPERATURE: 98 F | SYSTOLIC BLOOD PRESSURE: 121 MMHG | WEIGHT: 151.02 LBS

## 2024-04-05 DIAGNOSIS — Z98.890 OTHER SPECIFIED POSTPROCEDURAL STATES: Chronic | ICD-10-CM

## 2024-04-05 DIAGNOSIS — N84.0 POLYP OF CORPUS UTERI: ICD-10-CM

## 2024-04-05 DIAGNOSIS — E89.0 POSTPROCEDURAL HYPOTHYROIDISM: Chronic | ICD-10-CM

## 2024-04-05 DIAGNOSIS — Z64.0 PROBLEMS RELATED TO UNWANTED PREGNANCY: Chronic | ICD-10-CM

## 2024-04-05 DIAGNOSIS — Z98.89 OTHER SPECIFIED POSTPROCEDURAL STATES: Chronic | ICD-10-CM

## 2024-04-05 DIAGNOSIS — Z01.818 ENCOUNTER FOR OTHER PREPROCEDURAL EXAMINATION: ICD-10-CM

## 2024-04-05 LAB
ANION GAP SERPL CALC-SCNC: 12 MMOL/L — SIGNIFICANT CHANGE UP (ref 5–17)
BUN SERPL-MCNC: 16 MG/DL — SIGNIFICANT CHANGE UP (ref 7–23)
CALCIUM SERPL-MCNC: 9.4 MG/DL — SIGNIFICANT CHANGE UP (ref 8.4–10.5)
CHLORIDE SERPL-SCNC: 105 MMOL/L — SIGNIFICANT CHANGE UP (ref 96–108)
CO2 SERPL-SCNC: 21 MMOL/L — LOW (ref 22–31)
CREAT SERPL-MCNC: 0.82 MG/DL — SIGNIFICANT CHANGE UP (ref 0.5–1.3)
EGFR: 93 ML/MIN/1.73M2 — SIGNIFICANT CHANGE UP
GLUCOSE SERPL-MCNC: 143 MG/DL — HIGH (ref 70–99)
HCT VFR BLD CALC: 33.9 % — LOW (ref 34.5–45)
HGB BLD-MCNC: 11.1 G/DL — LOW (ref 11.5–15.5)
MCHC RBC-ENTMCNC: 26.4 PG — LOW (ref 27–34)
MCHC RBC-ENTMCNC: 32.7 GM/DL — SIGNIFICANT CHANGE UP (ref 32–36)
MCV RBC AUTO: 80.7 FL — SIGNIFICANT CHANGE UP (ref 80–100)
NRBC # BLD: 0 /100 WBCS — SIGNIFICANT CHANGE UP (ref 0–0)
PLATELET # BLD AUTO: 442 K/UL — HIGH (ref 150–400)
POTASSIUM SERPL-MCNC: 3.9 MMOL/L — SIGNIFICANT CHANGE UP (ref 3.5–5.3)
POTASSIUM SERPL-SCNC: 3.9 MMOL/L — SIGNIFICANT CHANGE UP (ref 3.5–5.3)
RBC # BLD: 4.2 M/UL — SIGNIFICANT CHANGE UP (ref 3.8–5.2)
RBC # FLD: 15.5 % — HIGH (ref 10.3–14.5)
SODIUM SERPL-SCNC: 138 MMOL/L — SIGNIFICANT CHANGE UP (ref 135–145)
WBC # BLD: 5.72 K/UL — SIGNIFICANT CHANGE UP (ref 3.8–10.5)
WBC # FLD AUTO: 5.72 K/UL — SIGNIFICANT CHANGE UP (ref 3.8–10.5)

## 2024-04-05 PROCEDURE — 85027 COMPLETE CBC AUTOMATED: CPT

## 2024-04-05 PROCEDURE — G0463: CPT

## 2024-04-05 PROCEDURE — 80048 BASIC METABOLIC PNL TOTAL CA: CPT

## 2024-04-05 RX ORDER — LEVOTHYROXINE SODIUM 125 MCG
1 TABLET ORAL
Refills: 0 | DISCHARGE

## 2024-04-05 RX ORDER — SODIUM CHLORIDE 9 MG/ML
3 INJECTION INTRAMUSCULAR; INTRAVENOUS; SUBCUTANEOUS EVERY 8 HOURS
Refills: 0 | Status: DISCONTINUED | OUTPATIENT
Start: 2024-04-16 | End: 2024-04-30

## 2024-04-05 RX ORDER — SODIUM CHLORIDE 9 MG/ML
1000 INJECTION, SOLUTION INTRAVENOUS
Refills: 0 | Status: DISCONTINUED | OUTPATIENT
Start: 2024-04-16 | End: 2024-04-30

## 2024-04-05 RX ORDER — PANTOPRAZOLE SODIUM 20 MG/1
1 TABLET, DELAYED RELEASE ORAL
Refills: 0 | DISCHARGE

## 2024-04-05 RX ORDER — BUDESONIDE AND FORMOTEROL FUMARATE DIHYDRATE 160; 4.5 UG/1; UG/1
2 AEROSOL RESPIRATORY (INHALATION)
Refills: 0 | DISCHARGE

## 2024-04-05 NOTE — H&P PST ADULT - OTHER CARE PROVIDERS
Dr Jailyn Gunderson -[phone] cardiologist , Dr Lucas Ware [phone] - endocrinologist Dr Jailyn Gunderson -[phone] cardiologist (will have appointment 4/9/2024) , Dr Lucas Ware [phone] - endocrinologist

## 2024-04-05 NOTE — H&P PST ADULT - HISTORY OF PRESENT ILLNESS
39 year old female [id number]. LMP 3/8/2024. PMH thyroid cancer (s/p total thyroidectomy, KILLIAN [id number]), c/o metromenorrhagia, imaging study revealed polyp of corpus uteri, now presents to [facility] scheduled for D&C on 4/16. 39 year old female [id number]. LMP 3/8/2024. PMH thyroid cancer (s/p total thyroidectomy, KILLIAN [id number]), c/o break through vaginal bleeed,, imaging study revealed polyp of corpus uteri, now presents to [facility] scheduled for D&C on 4/16.

## 2024-04-05 NOTE — H&P PST ADULT - PRIMARY CARE PROVIDER
Dr Lynn Bailey [facility] [id number] Dr Lynn Bailey [facility] [phone] (last visit 2023)

## 2024-04-05 NOTE — H&P PST ADULT - ATTENDING COMMENTS
Pt with hx of abnormal uterine bleeding with uterine polyp noted on sono.    Pt for D and C/EUA/removal of polyp with [facility] today    Pt understands risks/benefits of surgery including but not limited to bleeding, infection, injury to bowel/bladder, nerve damage, blood tx and even death.  Pt has verbalized understanding of the above and has signed informed consent.    SAMANTHA Sánchez

## 2024-04-05 NOTE — H&P PST ADULT - NSICDXPASTMEDICALHX_GEN_ALL_CORE_FT
PAST MEDICAL HISTORY:  Anemia     Chronic maxillary sinusitis     GERD (gastroesophageal reflux disease)     HLD (hyperlipidemia)     Hypothyroidism     Reactive airway disease     Thyroid cancer

## 2024-04-05 NOTE — H&P PST ADULT - ASSESSMENT
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

## 2024-04-05 NOTE — H&P PST ADULT - NSICDXPASTSURGICALHX_GEN_ALL_CORE_FT
PAST SURGICAL HISTORY:  H/O endoscopy     H/O total thyroidectomy     S/P abdominoplasty 2010    S/P dilatation and curettage 2004    Unwanted pregnancy 2001     PAST SURGICAL HISTORY:  H/O endoscopy     H/O nasal septoplasty     H/O total thyroidectomy     S/P abdominoplasty 2010    S/P dilatation and curettage 2004    Unwanted pregnancy 2001

## 2024-04-09 ENCOUNTER — APPOINTMENT (OUTPATIENT)
Dept: CARDIOLOGY | Facility: CLINIC | Age: 39
End: 2024-04-09
Payer: MEDICAID

## 2024-04-09 VITALS
SYSTOLIC BLOOD PRESSURE: 110 MMHG | DIASTOLIC BLOOD PRESSURE: 70 MMHG | HEART RATE: 82 BPM | WEIGHT: 150 LBS | HEIGHT: 63 IN | BODY MASS INDEX: 26.58 KG/M2 | OXYGEN SATURATION: 96 % | RESPIRATION RATE: 16 BRPM | TEMPERATURE: 97.6 F

## 2024-04-09 DIAGNOSIS — E78.5 HYPERLIPIDEMIA, UNSPECIFIED: ICD-10-CM

## 2024-04-09 DIAGNOSIS — R73.03 PREDIABETES.: ICD-10-CM

## 2024-04-09 PROCEDURE — 99214 OFFICE O/P EST MOD 30 MIN: CPT

## 2024-04-09 PROCEDURE — G2211 COMPLEX E/M VISIT ADD ON: CPT | Mod: NC,1L

## 2024-04-09 RX ORDER — FENOFIBRATE 145 MG/1
145 TABLET, COATED ORAL
Qty: 90 | Refills: 1 | Status: ACTIVE | COMMUNITY
Start: 2024-04-09 | End: 1900-01-01

## 2024-04-09 RX ORDER — ROSUVASTATIN CALCIUM 20 MG/1
20 TABLET, FILM COATED ORAL
Qty: 90 | Refills: 1 | Status: ACTIVE | COMMUNITY
Start: 2023-10-16 | End: 1900-01-01

## 2024-04-09 NOTE — REASON FOR VISIT
[CV Risk Factors and Non-Cardiac Disease] : CV risk factors and non-cardiac disease [Consultation] : a consultation regarding [Hyperlipidemia] : hyperlipidemia [FreeTextEntry3] : Dr. Lynn Nevarez [FreeTextEntry1] : This is a 39-year-old female with past medical history significant for hypertriglyceridemia, hyperlipidemia, hypothyroidism, reactive airway disease, thyroid cancer (s/o thyroidectomy 2015), s/p COVID May [id number] who comes in for lipid evaluation. She is overall doing well today and denies chest pain, shortness of breath, palpitations, lightheadedness, dizziness or syncope. She is currently on Fenofibrate 145 mg for triglycerides. She had a CT heart on 03/19/2024 which showed a calcium score of 0. Labs on 03/14/2024 She has allergies to oxycodone and a smoking history of 4 cigs/day for 5 years but stopped 10 years ago. Her father had a heart attack at age 56 and diabetes. Her mother and sister has high cholesterol, high triglycerides, and diabetes.  The patient is currently taking her Crestor 20 mg daily and restarted fenofibrate 145 mg in January 2024. On 03/14/2024, her cholesterol was 163, triglyceride 133, HDL 41, LDL 98, non- On 1/30/23, her cholesterol was 231, triglyceride 192, HDL 46, , A1c 5.4 On 7/8/22, her cholesterol was 220, triglyceride 226, HDL 39, , non-, A1c 5.8 On 1/27/22, her cholesterol was 244, triglyceride 269, HDL 41, , non- On 12/7/21, her cholesterol was 218, triglyceride 707, HDL 28, LDL 76, non- [FreeTextEntry2] : Lipid referral

## 2024-04-09 NOTE — PHYSICAL EXAM
[Well Developed] : well developed [Well Nourished] : well nourished [No Acute Distress] : no acute distress [Normal Venous Pressure] : normal venous pressure [No Carotid Bruit] : no carotid bruit [Normal S1, S2] : normal S1, S2 [No Rub] : no rub [No Gallop] : no gallop [Clear Lung Fields] : clear lung fields [Good Air Entry] : good air entry [No Respiratory Distress] : no respiratory distress  [Soft] : abdomen soft [Non Tender] : non-tender [No Masses/organomegaly] : no masses/organomegaly [Normal Bowel Sounds] : normal bowel sounds [Normal Gait] : normal gait [No Edema] : no edema [No Cyanosis] : no cyanosis [No Clubbing] : no clubbing [No Varicosities] : no varicosities [No Rash] : no rash [No Skin Lesions] : no skin lesions [Moves all extremities] : moves all extremities [No Focal Deficits] : no focal deficits [Normal Speech] : normal speech [Alert and Oriented] : alert and oriented [Normal memory] : normal memory [General Appearance - Well Developed] : well developed [Normal Appearance] : normal appearance [Well Groomed] : well groomed [General Appearance - Well Nourished] : well nourished [No Deformities] : no deformities [General Appearance - In No Acute Distress] : no acute distress [Normal Conjunctiva] : the conjunctiva exhibited no abnormalities [Eyelids - No Xanthelasma] : the eyelids demonstrated no xanthelasmas [Normal Oral Mucosa] : normal oral mucosa [Normal Jugular Venous A Waves Present] : normal jugular venous A waves present [Normal Jugular Venous V Waves Present] : normal jugular venous V waves present [No Jugular Venous Concepcion A Waves] : no jugular venous concepcion A waves [5th Left ICS - MCL] : palpated at the 5th LICS in the midclavicular line [Normal] : normal [No Precordial Heave] : no precordial heave was noted [Normal Rate] : normal [Rhythm Regular] : regular [Normal S1] : normal S1 [Normal S2] : normal S2 [No Murmur] : no murmurs heard [2+] : left 2+ [No Abnormalities] : the abdominal aorta was not enlarged and no bruit was heard [No Pitting Edema] : no pitting edema present [] : no respiratory distress [Respiration, Rhythm And Depth] : normal respiratory rhythm and effort [Exaggerated Use Of Accessory Muscles For Inspiration] : no accessory muscle use [Auscultation Breath Sounds / Voice Sounds] : lungs were clear to auscultation bilaterally [Abdomen Soft] : soft [Abnormal Walk] : normal gait [Gait - Sufficient For Exercise Testing] : the gait was sufficient for exercise testing [Cyanosis, Localized] : no localized cyanosis [Skin Color & Pigmentation] : normal skin color and pigmentation [Skin Turgor] : normal skin turgor [No Xanthoma] : no  xanthoma was observed [Oriented To Time, Place, And Person] : oriented to person, place, and time [Affect] : the affect was normal [Mood] : the mood was normal [No Anxiety] : not feeling anxious [II] : a grade 2 [Click] : no click [Distant] : the heart sounds were ~L not distant [Pericardial Rub] : no pericardial rub [Rt] : no varicose veins of the right leg [Lt] : no varicose veins of the left leg [Right Femoral Bruit] : no bruit heard over the right femoral artery [Left Femoral Bruit] : no bruit heard over the left femoral artery [Bruit] : no bruit heard [S3] : no S3 [S4] : no S4 [Right Carotid Bruit] : no bruit heard over the right carotid [Left Carotid Bruit] : no bruit heard over the left carotid

## 2024-04-09 NOTE — DISCUSSION/SUMMARY
[FreeTextEntry1] : This is a 39-year-old female with past medical history significant for thyroid cancer, hypertriglyceridemia, status post COVID-19 infection in May 2022, hypothyroidism, reactive airway disease, who comes in for lipid follow-up evaluation. She denies chest pain, shortness of breath, dizziness or syncope.  She was born in [facility] and has no history of rheumatic fever.  She does not drink excessive caffeine or alcohol. Patient restarted her fenofibrate 145 mg daily in January 2024 and continues on Crestor 20 mg daily. Lipid panel done March 14, 2024 demonstrated cholesterol 163, HDL of 41, triglycerides 133, non-HDL cholesterol 122, LDL direct 102 mg/dL. The patient will remain on this combination therapy.  She will have follow-up blood work in 6 months.  I have once again encouraged to work with a registered dietitian to improve her lipid profile.  She is also encouraged to increase her aerobic activity 40 minutes 4 times per week. She will follow-up with her primary care physician and endocrinologist. Lipid panel done October 5, 2023 demonstrated cholesterol 262, HDL 39, LDL calculated 184, non-HDL cholesterol 223, triglycerides 204 mg/dL.  Hemoglobin A1c 5.8 The patient's target LDL remains less than 100 mg/dL. The patient wishes to start medical therapy.  She has no plans on becoming pregnant but is aware that if she does get pregnant, she must stop the medication immediately. She is also interested in the presence of early atherosclerotic disease and coronary artery calcium score done March 19, 2024 was 0.  The patient had a lipid panel done January 30, 2023 which demonstrated cholesterol 231, triglycerides 192, HDL 46, LDL of 150 mg/dL, hemoglobin A1c of 5.4.  Her 10-year ASCVD risk score is 3.9% but her lifetime risk of atherosclerotic cardiovascular disease is 39% At this point in time I would recommend lifestyle modification, dietary intervention and exercise to improve her lipid profile.  She is planning on having children and I would not recommend statin therapy at this time. Cardiac risk factors include hyperlipidemia, positive family history of atherosclerosis, (father had myocardial infarction age 56 as well as diabetes), history of smoking history of 4 cigs/day for 5 years(stopped 10 years) ago. Her mother and sister has high cholesterol, high triglycerides, and diabetes.  Her triglycerides have been as high as 707 mg/dL. The fenofibrate has been controlling her triglycerides adequately. Hopefully with lifestyle modification, she will not require additional therapy. On 1/30/23, her cholesterol was 231, triglyceride 192, HDL 46, , A1c 5.4 On 7/8/22, her cholesterol was 220, triglyceride 226, HDL 39, , non-, A1c 5.8 On 1/27/22, her cholesterol was 244, triglyceride 269, HDL 41, , non- On 12/7/21, her cholesterol was 218, triglyceride 707, HDL 28, LDL 76, non- PMH: She was diagnosed with thyroid cancer and underwent a thyroidectomy in 2015 followed by radioactive iodine twice.  She is currently on Synthroid therapy. The patient had blood work done June 8, 2020 which demonstrated cholesterol 206, triglycerides 248 mg/dL, HDL 36 mg/dL, and calculated LDL of 120 mg/dL. Blood work done through [facility] September 11, 2020 demonstrated cholesterol 223, triglycerides of 347, HDL of 33, calculated  mg/dL.  She reports that her mother had high triglycerides and is currently on fenofibrate.  The patient understands that aerobic exercises must be increased to 40 minutes 4 times per week. A detailed discussion of lifestyle modification was done today. The patient has a good understanding of the diagnosis, and treatment plan. Lifestyle modification was also outlined.

## 2024-04-09 NOTE — ASSESSMENT
[FreeTextEntry1] : Prior note nurse practitioner Miroslava January 2, 2024::  This is a 38-year-old female with past medical history significant for thyroid cancer (s/p thyroidectomy 2015), hypothyroidism, hypertriglyceridemia, s/p COVID-19 infection in May 2022, reactive airway disease, who comes in for lipid follow-up evaluation.  She was born in [facility] and has no history of rheumatic fever.   She does not drink excessive caffeine or alcohol.  Cardiac risk factors include hyperlipidemia, positive family history of atherosclerosis, (father had myocardial infarction age 56 as well as diabetes), history of smoking history of 4 cigs/day for 5 years (stopped 10 years ago).   Family History: Her mother and sister have high cholesterol, high triglycerides, and diabetes.   HPI: She is feeling generally well today and denies chest pain, dizziness, heart palpitations, recent episodes of syncope or falls, SOB, or dyspnea at this time.  Current Medications: Rosuvastatin 20 mg daily   She reports stopping her Fenofibrate 145 mg daily after beginning Rosuvastatin 20 mg daily at her last appointment.   PMH: Her triglycerides have been as high as 707 mg/dL. The fenofibrate has been controlling her triglycerides adequately.  She was ordered for a CT calcium score at her last appointment but has not completed this yet.    BLOOD PRESSURE: Controlled.    BLOODWORK:  *LDL target goal < 100* -New blood work was done 01/02/2024 to evaluate lipid profile, CBC, BMP, hepatic function, A1C and TSH. -Lipid panel done October 5, 2023 demonstrated cholesterol 262, HDL 39, LDL calculated 184, non-HDL cholesterol 223, triglycerides 204 mg/dL.  Hemoglobin A1c 5.8 -The patient had a lipid panel done January 30, 2023 which demonstrated cholesterol 231, triglycerides 192, HDL 46, LDL of 150 mg/dL, hemoglobin A1c of 5.4. -On 7/8/22, her cholesterol was 220, triglyceride 226, HDL 39, , non-, A1c 5.8 -On 1/27/22, her cholesterol was 244, triglyceride 269, HDL 41, , non- -On 12/7/21, her cholesterol was 218, triglyceride 707, HDL 28, LDL 76, non-   TESTING/REPORTS: -EKG done October 2020 demonstrated normal sinus rhythm rate 71 bpm otherwise unremarkable.    PLAN: -She will continue her current medications and contact the office if problems arise before next follow-up appointment. -She will complete a CT calcium score to evaluate the presence of early atherosclerotic disease.  -She will follow-up with her primary care doctor routinely.    I have discussed the plan of care with BING CASTRO and she will follow up in 3 months. They are compliant with all of their medications.   The patient understands that aerobic exercises must be increased to 40 minutes 4 times/week and a detailed discussion of lifestyle modification was done today.   The patient has a good understanding of the diagnosis, treatment plan and lifestyle modification.   They will contact me at the office for any questions with their care or any changes in their health status.   The patient was discussed with supervision physician Dr. Neptali Glaser at the time of the visit and the plan of care will be carried out as noted above.     BILL [place] NP

## 2024-04-15 ENCOUNTER — RX RENEWAL (OUTPATIENT)
Age: 39
End: 2024-04-15

## 2024-04-15 ENCOUNTER — NON-APPOINTMENT (OUTPATIENT)
Age: 39
End: 2024-04-15

## 2024-04-15 ENCOUNTER — TRANSCRIPTION ENCOUNTER (OUTPATIENT)
Age: 39
End: 2024-04-15

## 2024-04-16 ENCOUNTER — OUTPATIENT (OUTPATIENT)
Dept: OUTPATIENT SERVICES | Facility: HOSPITAL | Age: 39
LOS: 1 days | End: 2024-04-16
Payer: MEDICAID

## 2024-04-16 ENCOUNTER — TRANSCRIPTION ENCOUNTER (OUTPATIENT)
Age: 39
End: 2024-04-16

## 2024-04-16 ENCOUNTER — RESULT REVIEW (OUTPATIENT)
Age: 39
End: 2024-04-16

## 2024-04-16 ENCOUNTER — APPOINTMENT (OUTPATIENT)
Dept: OBGYN | Facility: HOSPITAL | Age: 39
End: 2024-04-16

## 2024-04-16 VITALS
TEMPERATURE: 98 F | WEIGHT: 151.02 LBS | HEIGHT: 62 IN | SYSTOLIC BLOOD PRESSURE: 116 MMHG | DIASTOLIC BLOOD PRESSURE: 80 MMHG | HEART RATE: 75 BPM | RESPIRATION RATE: 16 BRPM | OXYGEN SATURATION: 99 %

## 2024-04-16 VITALS
SYSTOLIC BLOOD PRESSURE: 107 MMHG | OXYGEN SATURATION: 100 % | HEART RATE: 71 BPM | DIASTOLIC BLOOD PRESSURE: 75 MMHG | RESPIRATION RATE: 15 BRPM

## 2024-04-16 DIAGNOSIS — Z98.89 OTHER SPECIFIED POSTPROCEDURAL STATES: Chronic | ICD-10-CM

## 2024-04-16 DIAGNOSIS — Z64.0 PROBLEMS RELATED TO UNWANTED PREGNANCY: Chronic | ICD-10-CM

## 2024-04-16 DIAGNOSIS — E89.0 POSTPROCEDURAL HYPOTHYROIDISM: Chronic | ICD-10-CM

## 2024-04-16 DIAGNOSIS — Z98.890 OTHER SPECIFIED POSTPROCEDURAL STATES: Chronic | ICD-10-CM

## 2024-04-16 DIAGNOSIS — N84.0 POLYP OF CORPUS UTERI: ICD-10-CM

## 2024-04-16 PROCEDURE — 88305 TISSUE EXAM BY PATHOLOGIST: CPT | Mod: 26

## 2024-04-16 PROCEDURE — C1782: CPT

## 2024-04-16 PROCEDURE — 58558 HYSTEROSCOPY BIOPSY: CPT

## 2024-04-16 PROCEDURE — 88305 TISSUE EXAM BY PATHOLOGIST: CPT

## 2024-04-16 DEVICE — AVETA SMOL RESECTING DEVICE 2.9MM: Type: IMPLANTABLE DEVICE | Status: FUNCTIONAL

## 2024-04-16 RX ORDER — LEVOTHYROXINE SODIUM 125 MCG
1 TABLET ORAL
Refills: 0 | DISCHARGE

## 2024-04-16 RX ORDER — IBUPROFEN 200 MG
1 TABLET ORAL
Qty: 0 | Refills: 0 | DISCHARGE

## 2024-04-16 RX ORDER — ONDANSETRON 8 MG/1
4 TABLET, FILM COATED ORAL ONCE
Refills: 0 | Status: DISCONTINUED | OUTPATIENT
Start: 2024-04-16 | End: 2024-04-30

## 2024-04-16 RX ORDER — FENOFIBRATE,MICRONIZED 130 MG
1 CAPSULE ORAL
Refills: 0 | DISCHARGE

## 2024-04-16 RX ORDER — ROSUVASTATIN CALCIUM 5 MG/1
1 TABLET ORAL
Refills: 0 | DISCHARGE

## 2024-04-16 RX ORDER — FEXOFENADINE HCL 30 MG
1 TABLET ORAL
Refills: 0 | DISCHARGE

## 2024-04-16 RX ORDER — LIDOCAINE HCL 20 MG/ML
0.2 VIAL (ML) INJECTION ONCE
Refills: 0 | Status: COMPLETED | OUTPATIENT
Start: 2024-04-16 | End: 2024-04-16

## 2024-04-16 RX ORDER — HYDROMORPHONE HYDROCHLORIDE 2 MG/ML
0.5 INJECTION INTRAMUSCULAR; INTRAVENOUS; SUBCUTANEOUS
Refills: 0 | Status: DISCONTINUED | OUTPATIENT
Start: 2024-04-16 | End: 2024-04-16

## 2024-04-16 RX ORDER — ACETAMINOPHEN 500 MG
3 TABLET ORAL
Qty: 0 | Refills: 0 | DISCHARGE

## 2024-04-16 RX ADMIN — SODIUM CHLORIDE 100 MILLILITER(S): 9 INJECTION, SOLUTION INTRAVENOUS at 07:34

## 2024-04-16 RX ADMIN — SODIUM CHLORIDE 3 MILLILITER(S): 9 INJECTION INTRAMUSCULAR; INTRAVENOUS; SUBCUTANEOUS at 07:42

## 2024-04-16 NOTE — ASU PATIENT PROFILE, ADULT - NSICDXPASTSURGICALHX_GEN_ALL_CORE_FT
PAST SURGICAL HISTORY:  H/O endoscopy     H/O nasal septoplasty     H/O total thyroidectomy     S/P abdominoplasty 2010    S/P dilatation and curettage 2004    Unwanted pregnancy 2001

## 2024-04-16 NOTE — BRIEF OPERATIVE NOTE - NSICDXBRIEFPROCEDURE_GEN_ALL_CORE_FT
PROCEDURES:  Hysteroscopy with dilation and curettage of uterus 16-Apr-2024 09:44:41  Gregg Rain  Polypectomy, uterus 16-Apr-2024 09:44:48  Gregg Rain

## 2024-04-16 NOTE — ASU DISCHARGE PLAN (ADULT/PEDIATRIC) - PROVIDER TOKENS
FREE:[LAST:[GYN CLINIC],PHONE:[(   )    -],FAX:[(   )    -],ADDRESS:[[address] [place] [address]  [phone]]] FREE:[LAST:[GYN CLINIC],PHONE:[(   )    -],FAX:[(   )    -],ADDRESS:[[address] [place] [address]  [phone]],FOLLOWUP:[2 weeks]]

## 2024-04-16 NOTE — ASU DISCHARGE PLAN (ADULT/PEDIATRIC) - CARE PROVIDER_API CALL
GYN CLINIC,   [address] [place] [address] [address]  [place] [address]  [phone]  Phone: (   )    -  Fax: (   )    -  Follow Up Time:    GYN CLINIC,   [address] [place] [address] [address]  [place] [address]  [phone]  Phone: (   )    -  Fax: (   )    -  Follow Up Time: 2 weeks

## 2024-04-16 NOTE — PACU DISCHARGE NOTE - NSPTMEETSDISCHCRITERIADT_GEN_A_CORE
16-Apr-2024 10:25 65 year old female presents with a left sided headache secondary to a mechanical fall. Neuro exam is normal. Although a low risk, I will get a head CT to rule out more serious pathology.    Head CT unremarkable, will discharge home with oral ibuprofen. 65 year old female presents with a left sided headache secondary to a mechanical fall. Neuro exam is normal. Although a low risk, I will get a head CT to rule out more serious pathology.

## 2024-04-16 NOTE — ASU DISCHARGE PLAN (ADULT/PEDIATRIC) - NURSING INSTRUCTIONS
Next dose of Tylenol will be on or after __3pm_________ ,today/tonight and every 6 hours afterwards for pain management, do not take any Tylenol containing products until this time. Your first dose of Tylenol was given at ___9am________. Do not exceed more than 4000mg of Tylenol in one 24 hour setting. If no contraindications, you may alternate with Ibuprofen 3 hours after dose of Tylenol. Ibuprofen can be taken every 6 hours.  Next dose of ibuprofen can be taken at/after--- 3:15pm-- if needed. First dose was given at 9:15am in the OR.

## 2024-04-16 NOTE — BRIEF OPERATIVE NOTE - COMMENTS
Fluid deficit 140ml  Dictation per Dr. Rain PGY1 Fluid deficit 140ml  Dictation per Dr. Rain PGY1 #[id number]

## 2024-04-16 NOTE — BRIEF OPERATIVE NOTE - OPERATION/FINDINGS
EUA: grossly normal appearing external genitalia, cervix, vagina. Atv ut, 8 wk size.  Hysteroscopy: bilateral ostia visualized, wnl. Polyp 1cm visualized on fundus of uterus. Otherwise grossl normal appearing uterine cavity and endometrium.   EUA: grossly normal appearing external genitalia, cervix, vagina. Atv ut, 8 wk size.  Hysteroscopy: bilateral ostia visualized, wnl. Polyp 1cm visualized on fundus of uterus, completely resected with Aveta. Otherwise grossl normal appearing uterine cavity and endometrium.

## 2024-04-22 ENCOUNTER — APPOINTMENT (OUTPATIENT)
Dept: PULMONOLOGY | Facility: CLINIC | Age: 39
End: 2024-04-22
Payer: MEDICAID

## 2024-04-22 VITALS
OXYGEN SATURATION: 93 % | RESPIRATION RATE: 16 BRPM | DIASTOLIC BLOOD PRESSURE: 68 MMHG | TEMPERATURE: 97 F | WEIGHT: 150 LBS | HEART RATE: 67 BPM | BODY MASS INDEX: 26.58 KG/M2 | HEIGHT: 63 IN | SYSTOLIC BLOOD PRESSURE: 110 MMHG

## 2024-04-22 DIAGNOSIS — Z83.3 FAMILY HISTORY OF DIABETES MELLITUS: ICD-10-CM

## 2024-04-22 DIAGNOSIS — R93.89 ABNORMAL FINDINGS ON DIAGNOSTIC IMAGING OF OTHER SPECIFIED BODY STRUCTURES: ICD-10-CM

## 2024-04-22 DIAGNOSIS — R06.02 SHORTNESS OF BREATH: ICD-10-CM

## 2024-04-22 DIAGNOSIS — R05.3 CHRONIC COUGH: ICD-10-CM

## 2024-04-22 DIAGNOSIS — Z87.891 PERSONAL HISTORY OF NICOTINE DEPENDENCE: ICD-10-CM

## 2024-04-22 DIAGNOSIS — Z83.438 FAMILY HISTORY OF OTHER DISORDER OF LIPOPROTEIN METABOLISM AND OTHER LIPIDEMIA: ICD-10-CM

## 2024-04-22 DIAGNOSIS — K21.9 GASTRO-ESOPHAGEAL REFLUX DISEASE W/OUT ESOPHAGITIS: ICD-10-CM

## 2024-04-22 DIAGNOSIS — R06.83 SNORING: ICD-10-CM

## 2024-04-22 DIAGNOSIS — J30.89 OTHER ALLERGIC RHINITIS: ICD-10-CM

## 2024-04-22 DIAGNOSIS — R79.89 OTHER SPECIFIED ABNORMAL FINDINGS OF BLOOD CHEMISTRY: ICD-10-CM

## 2024-04-22 LAB — SURGICAL PATHOLOGY STUDY: SIGNIFICANT CHANGE UP

## 2024-04-22 PROCEDURE — 71046 X-RAY EXAM CHEST 2 VIEWS: CPT

## 2024-04-22 PROCEDURE — 99204 OFFICE O/P NEW MOD 45 MIN: CPT | Mod: 25

## 2024-04-22 PROCEDURE — 95012 NITRIC OXIDE EXP GAS DETER: CPT

## 2024-04-22 PROCEDURE — 94060 EVALUATION OF WHEEZING: CPT

## 2024-04-22 PROCEDURE — 94729 DIFFUSING CAPACITY: CPT

## 2024-04-22 PROCEDURE — 94727 GAS DIL/WSHOT DETER LNG VOL: CPT

## 2024-04-22 PROCEDURE — 94618 PULMONARY STRESS TESTING: CPT

## 2024-04-22 RX ORDER — OLOPATADINE HYDROCHLORIDE AND MOMETASONE FUROATE 25; 665 UG/1; UG/1
665-25 SPRAY, METERED NASAL
Qty: 3 | Refills: 1 | Status: ACTIVE | COMMUNITY
Start: 2024-04-22 | End: 1900-01-01

## 2024-04-22 RX ORDER — FAMOTIDINE 40 MG/1
40 TABLET, FILM COATED ORAL
Qty: 90 | Refills: 2 | Status: ACTIVE | COMMUNITY
Start: 2024-04-22 | End: 1900-01-01

## 2024-04-22 NOTE — ASSESSMENT
[FreeTextEntry1] : Ms. CASTRO is a 39 year old female originally from [facility] with a history of COVID-19(2022), Thyroid cancer s/p thyroidectomy (2015), HLD, irregular menstrual cycle, low vitamin D, LPRD, pre-diabetes, post-nasal drip, chronic cough/sinus, abnormal CT of the chest (module), consistent with intra-parenchymal lymph node who now comes to the office for an initial pulmonary evaluation for SOB, chronic cough, (doubting asthma), likely allergy post-nasal drip, GERD/LPR, snoring ?LEXIE, abnormal CT of chest   Her shortness of breath is multifactorial due to: -poor mechanics of breathing -out of shape -overweight -pulmonary disease   -doubtful -cardiac disease    Problem 1: cough variant asthma -complete methacholine challenge -Asthma is believed to be caused by inherited (genetic) and environmental factor, but its exact cause is unknown. Asthma may be triggered by allergens, lung infections, or irritants in the air. Asthma triggers are different for each person.  Problem 2: chronic cough  Her chronic cough is felt to be multifactorial due to: -Asthma -allergies/ PND -GERD -TBM -eosinophilic esophagitis    Problem 2a: allergies/sinus -complete blood work: asthma panel, food IgE panel, IgE level, eosinophil level, vitamin D level -add Allegra 180 mg QAM -add Ryaltris 1 sniff BID -Environmental measures for allergies were encouraged including mattress and pillow covers, air purifier, and environmental controls.   Problem 2b: GERD -add Pepcid 40 mg QHS -Rule of 2s: avoid eating too much, eating too late, eating too spicy, eating two hours before bed. -Things to avoid including overeating, spicy foods, tight clothing, eating within three hours of bed, this list is not all inclusive. -For treatment of reflux, possible options discussed including diet control, H2 blockers, PPIs, as well as coating motility agents discussed as treatment options. Timing of meals and proximity of last meal to sleep were discussed. If symptoms persist, a formal gastrointestinal evaluation is needed.  Problem 3: abnormal chest CT (nodule) Next chest CT (April 2025) -CAT scans are the only radiological modality to identify abnormalities within the lungs with regards to nodules/masses/lymph nodes. Risks, benefits were reviewed in detail. The guidelines for abnormalities include follow up CT scans at various intervals which could range from 6 weeks to 1 year intervals. If there is a change for the worse then consideration for a biopsy will be considered if the patient is a candidate. Second opinion evaluation with a thoracic surgeon or an interventional radiologist could be offered.  Problem 4: LEXIE (elevated Mallampati class, poor quality sleep, snoring, neck size >16) -complete home sleep study -Sleep apnea is associated with adverse clinical consequences which can affect most organ systems. Cardiovascular disease risk includes arrhythmias, atrial fibrillation, hypertension, coronary artery disease, and stroke. Metabolic disorders include diabetes type 2, non-alcoholic fatty liver disease. Mood disorder especially depression; and cognitive decline especially in the elderly. Associations with chronic reflux/Adame's esophagus some but not all inclusive. -Reasons include arousal consistent with hypopnea; respiratory events most prominent in REM sleep or supine position; therefore sleep staging and body position are important for accurate diagnosis and estimation of AHI.  Problem 5: cardiac disease -recommended to continue to follow up with Cardiologist (Neptali Glaser)   Problem 6: poor breathing mechanics -Recommended Kelly Ibrahim and Kaleb breathing technique -Proper breathing techniques were reviewed with an emphasis on exhalation. Patient was instructed to breathe in for 1 second and out for 4 seconds. The patient was encouraged to not talk while walking.   Problem 7: overweight/ out of shape -Weight loss, exercise, and diet control were discussed and are highly encouraged. Treatment options are given such as aqua therapy, and contacting a nutritionist. Recommended to use the elliptical, stationary bike, less use of the treadmill.   Problem 8: health maintenance -recommended [employer] -recommended yearly flu shot after October 15, 2023 -recommended strep pneumonia vaccines: Prevnar-20 vaccine, followed by Pneumo vaccine 23 one year following after 65 years old. -recommended early intervention for Upper Respiratory Infections (URIs) -recommended regular osteoporosis evaluations -recommended early dermatological evaluations -recommended after the age of 50 to the age of 70, colonoscopy every 5 years   F/P in 6-8 weeks. She is encouraged to call with any changes, concerns, or questions

## 2024-04-22 NOTE — REASON FOR VISIT
[Initial] : an initial visit [TextBox_44] : SOB, chronic cough, (doubting asthma), likely allergy post-nasal drip, GERD/LPR, snoring ?LEXIE, abnormal CT of chest

## 2024-04-22 NOTE — ADDENDUM
[FreeTextEntry1] : Documented by Rima Angelo acting as a scribe for Dr. Timmy East on 04/22/2024. All medical record entries made by the Scribe were at my, Dr. Timmy East's, direction and personally dictated by me on 04/22/2024. I have reviewed the chart and agree that the record accurately reflects my personal performance of the history, physical exam, assessment and plan. I have also personally directed, reviewed, and agree with the discharge instructions.

## 2024-04-23 ENCOUNTER — APPOINTMENT (OUTPATIENT)
Dept: PULMONOLOGY | Facility: CLINIC | Age: 39
End: 2024-04-23

## 2024-04-23 ENCOUNTER — NON-APPOINTMENT (OUTPATIENT)
Age: 39
End: 2024-04-23

## 2024-04-23 DIAGNOSIS — C73 MALIGNANT NEOPLASM OF THYROID GLAND: ICD-10-CM

## 2024-04-23 LAB
THYROGLOB AB SERPL-ACNC: <20 IU/ML
THYROGLOB SERPL-MCNC: <0.2 NG/ML

## 2024-04-30 ENCOUNTER — LABORATORY RESULT (OUTPATIENT)
Age: 39
End: 2024-04-30

## 2024-04-30 ENCOUNTER — APPOINTMENT (OUTPATIENT)
Dept: OBGYN | Facility: CLINIC | Age: 39
End: 2024-04-30
Payer: MEDICAID

## 2024-04-30 ENCOUNTER — OUTPATIENT (OUTPATIENT)
Dept: OUTPATIENT SERVICES | Facility: HOSPITAL | Age: 39
LOS: 1 days | End: 2024-04-30
Payer: MEDICAID

## 2024-04-30 VITALS — DIASTOLIC BLOOD PRESSURE: 70 MMHG | SYSTOLIC BLOOD PRESSURE: 108 MMHG | BODY MASS INDEX: 26.57 KG/M2 | WEIGHT: 150 LBS

## 2024-04-30 DIAGNOSIS — Z64.0 PROBLEMS RELATED TO UNWANTED PREGNANCY: Chronic | ICD-10-CM

## 2024-04-30 DIAGNOSIS — E89.0 POSTPROCEDURAL HYPOTHYROIDISM: Chronic | ICD-10-CM

## 2024-04-30 DIAGNOSIS — Z98.89 OTHER SPECIFIED POSTPROCEDURAL STATES: Chronic | ICD-10-CM

## 2024-04-30 DIAGNOSIS — Z98.890 OTHER SPECIFIED POSTPROCEDURAL STATES: Chronic | ICD-10-CM

## 2024-04-30 DIAGNOSIS — N76.0 ACUTE VAGINITIS: ICD-10-CM

## 2024-04-30 PROCEDURE — 99213 OFFICE O/P EST LOW 20 MIN: CPT

## 2024-04-30 PROCEDURE — G0463: CPT

## 2024-05-01 ENCOUNTER — NON-APPOINTMENT (OUTPATIENT)
Age: 39
End: 2024-05-01

## 2024-05-01 ENCOUNTER — APPOINTMENT (OUTPATIENT)
Dept: DERMATOLOGY | Facility: CLINIC | Age: 39
End: 2024-05-01
Payer: MEDICAID

## 2024-05-01 DIAGNOSIS — L98.8 OTHER SPECIFIED DISORDERS OF THE SKIN AND SUBCUTANEOUS TISSUE: ICD-10-CM

## 2024-05-01 DIAGNOSIS — L70.0 ACNE VULGARIS: ICD-10-CM

## 2024-05-01 DIAGNOSIS — R89.9 UNSPECIFIED ABNORMAL FINDING IN SPECIMENS FROM OTHER ORGANS, SYSTEMS AND TISSUES: ICD-10-CM

## 2024-05-01 DIAGNOSIS — L81.4 OTHER MELANIN HYPERPIGMENTATION: ICD-10-CM

## 2024-05-01 DIAGNOSIS — D18.01 HEMANGIOMA OF SKIN AND SUBCUTANEOUS TISSUE: ICD-10-CM

## 2024-05-01 LAB
24R-OH-CALCIDIOL SERPL-MCNC: 49.9 PG/ML
25(OH)D3 SERPL-MCNC: 25.5 NG/ML
A1AT SERPL-MCNC: 184 MG/DL
BASOPHILS # BLD AUTO: 0.05 K/UL
BASOPHILS NFR BLD AUTO: 0.6 %
EOSINOPHIL # BLD AUTO: 0.32 K/UL
EOSINOPHIL NFR BLD AUTO: 3.7 %
HCT VFR BLD CALC: 36.2 %
HGB BLD-MCNC: 11.3 G/DL
IMM GRANULOCYTES NFR BLD AUTO: 0.3 %
LYMPHOCYTES # BLD AUTO: 2.52 K/UL
LYMPHOCYTES NFR BLD AUTO: 29.2 %
MAN DIFF?: NORMAL
MCHC RBC-ENTMCNC: 26.5 PG
MCHC RBC-ENTMCNC: 31.2 GM/DL
MCV RBC AUTO: 85 FL
MONOCYTES # BLD AUTO: 0.83 K/UL
MONOCYTES NFR BLD AUTO: 9.6 %
NEUTROPHILS # BLD AUTO: 4.89 K/UL
NEUTROPHILS NFR BLD AUTO: 56.6 %
PLATELET # BLD AUTO: 467 K/UL
RBC # BLD: 4.26 M/UL
RBC # FLD: 16.8 %
WBC # FLD AUTO: 8.64 K/UL

## 2024-05-01 PROCEDURE — 99214 OFFICE O/P EST MOD 30 MIN: CPT

## 2024-05-01 RX ORDER — LIDOCAINE AND PRILOCAINE 25; 25 MG/G; MG/G
2.5-2.5 CREAM TOPICAL
Qty: 1 | Refills: 1 | Status: ACTIVE | COMMUNITY
Start: 2024-05-01 | End: 1900-01-01

## 2024-05-01 NOTE — ASSESSMENT
[FreeTextEntry1] : plan for microneedling at [facility]; discussed PRP and microneedle at [facility]  will plan for microneedle  EMLA prior to tx; SED

## 2024-05-02 ENCOUNTER — APPOINTMENT (OUTPATIENT)
Dept: OBGYN | Facility: CLINIC | Age: 39
End: 2024-05-02

## 2024-05-02 LAB
A ALTERNATA IGE QN: <0.1 KUA/L
A FUMIGATUS IGE QN: <0.1 KUA/L
ALMOND IGE QN: 0.28 KUA/L
BRAZIL NUT IGE QN: <0.1 KUA/L
C ALBICANS IGE QN: <0.1 KUA/L
C HERBARUM IGE QN: <0.1 KUA/L
CASHEW NUT IGE QN: <0.1 KUA/L
CAT DANDER IGE QN: <0.1 KUA/L
CODFISH IGE QN: <0.1 KUA/L
COMMON RAGWEED IGE QN: 0.17 KUA/L
COW MILK IGE QN: <0.1 KUA/L
D FARINAE IGE QN: 0.79 KUA/L
D PTERONYSS IGE QN: 0.57 KUA/L
DEPRECATED A ALTERNATA IGE RAST QL: 0 (ref 0–?)
DEPRECATED A FUMIGATUS IGE RAST QL: 0 (ref 0–?)
DEPRECATED ALMOND IGE RAST QL: NORMAL (ref 0–?)
DEPRECATED BRAZIL NUT IGE RAST QL: 0 (ref 0–?)
DEPRECATED C ALBICANS IGE RAST QL: 0
DEPRECATED C HERBARUM IGE RAST QL: 0 (ref 0–?)
DEPRECATED CASHEW NUT IGE RAST QL: 0 (ref 0–?)
DEPRECATED CAT DANDER IGE RAST QL: 0 (ref 0–?)
DEPRECATED CODFISH IGE RAST QL: 0 (ref 0–?)
DEPRECATED COMMON RAGWEED IGE RAST QL: NORMAL (ref 0–?)
DEPRECATED COW MILK IGE RAST QL: 0 (ref 0–?)
DEPRECATED D FARINAE IGE RAST QL: 2 (ref 0–?)
DEPRECATED D PTERONYSS IGE RAST QL: 1 (ref 0–?)
DEPRECATED DOG DANDER IGE RAST QL: 0 (ref 0–?)
DEPRECATED DUCK FEATHER IGE RAST QL: 0
DEPRECATED EGG WHITE IGE RAST QL: 0 (ref 0–?)
DEPRECATED GOOSE FEATHER IGE RAST QL: 0
DEPRECATED HAZELNUT IGE RAST QL: 3 (ref 0–?)
DEPRECATED M RACEMOSUS IGE RAST QL: 0
DEPRECATED PEANUT IGE RAST QL: NORMAL (ref 0–?)
DEPRECATED ROACH IGE RAST QL: 1 (ref 0–?)
DEPRECATED SALMON IGE RAST QL: 0 (ref 0–?)
DEPRECATED SCALLOP IGE RAST QL: <0.1 KUA/L
DEPRECATED SESAME SEED IGE RAST QL: NORMAL (ref 0–?)
DEPRECATED SHRIMP IGE RAST QL: NORMAL (ref 0–?)
DEPRECATED SOYBEAN IGE RAST QL: 0 (ref 0–?)
DEPRECATED TIMOTHY IGE RAST QL: 0 (ref 0–?)
DEPRECATED TUNA IGE RAST QL: 0 (ref 0–?)
DEPRECATED WALNUT IGE RAST QL: 0 (ref 0–?)
DEPRECATED WHEAT IGE RAST QL: 0 (ref 0–?)
DEPRECATED WHITE OAK IGE RAST QL: 3 (ref 0–?)
DOG DANDER IGE QN: <0.1 KUA/L
DUCK FEATHER IGE QN: <0.1 KUA/L
EGG WHITE IGE QN: <0.1 KUA/L
GOOSE FEATHER IGE QN: <0.1 KUA/L
HAZELNUT IGE QN: 9.49 KUA/L
M RACEMOSUS IGE QN: <0.1 KUA/L
PEANUT IGE QN: 0.18 KUA/L
ROACH IGE QN: 0.42 KUA/L
SALMON IGE QN: <0.1 KUA/L
SCALLOP IGE QN: 0 (ref 0–?)
SCALLOP IGE QN: 0.34 KUA/L
SESAME SEED IGE QN: 0.15 KUA/L
SOYBEAN IGE QN: <0.1 KUA/L
TIMOTHY IGE QN: <0.1 KUA/L
TOTAL IGE SMQN RAST: 1048 KU/L
TUNA IGE QN: <0.1 KUA/L
WALNUT IGE QN: <0.1 KUA/L
WHEAT IGE QN: <0.1 KUA/L
WHITE OAK IGE QN: 15.2 KUA/L

## 2024-05-03 LAB — STRONGYLOIDES AB SER IA-ACNC: NEGATIVE

## 2024-05-03 NOTE — PLAN
[FreeTextEntry1] : BING CASTRO presents for a post-op evaluation. Post-Op Day: #4/16/24, Procedure: Diagnostic hysteroscopy and polypectomy, Indication: AUB. -reviewed hx of HCM: Pap 10/2023 LSIL, hpv neg, patient states hx of abnormal pap 1/2023, pt did not have records. Spoke with Dr. Robertson, reasonable for repeat cotesting in October 2024. Patient understnads to make appointment for then. -Patient doing well postoperatively -Pathology reviewed with benign findings  [place]as PGY2 dw Dr. Robertson

## 2024-05-03 NOTE — HISTORY OF PRESENT ILLNESS
[None] : no vaginal bleeding [Pathology reviewed] : pathology reviewed [Fever] : no fever [Chills] : no chills [Nausea] : no nausea [Vaginal Bleeding] : no vaginal bleeding [Pelvic Pressure] : no pelvic pressure [Vaginal Discharge] : no vaginal discharge [de-identified] : Diagnostic hysteroscopy and polypectomy [de-identified] : 13 [de-identified] : TRACEY [de-identified] : 39y  presenting for f/u s/p diagnostic hysteroscopy with polypectomy.  LMP 10 Denies fever, pelvic pain or vaginal bleeding.  GYN Hx: Hx of intermenstrual bleeding, endorses regular monthly periods HCM: Pap 10/2023 LSIL, hpv neg Surgical Pathology Report - Auth (Verified)  Specimen(s) Submitted 1.  Endocervix curettings 2.  Endometrial curetting 3.  Endometrial polyp  Final Diagnosis 1.  Endocervix (curettage): -   Fragments of benign endocervical tissue.   2.  Endometrium (curettage): -   Fragments of proliferative endometrium. -   Fragments of endometrial polyp.  3.  Endometrium (polypectomy): -   Fragments of endometrial polyp. Verified by: Shirley Kapoor MD [de-identified] : Speculum: No vaginal bleeding. Cervix erythematous with nabothian cyst like lesions at 12, 4, 7 o clock position. 12 position with increased vascularity.

## 2024-05-06 DIAGNOSIS — Z98.890 OTHER SPECIFIED POSTPROCEDURAL STATES: ICD-10-CM

## 2024-05-06 LAB
A1AT PHENOTYP SERPL-IMP: NORMAL
A1AT SERPL-MCNC: 183 MG/DL

## 2024-05-14 ENCOUNTER — RX RENEWAL (OUTPATIENT)
Age: 39
End: 2024-05-14

## 2024-05-14 RX ORDER — TRETINOIN 0.25 MG/G
0.03 GEL TOPICAL
Qty: 15 | Refills: 1 | Status: ACTIVE | COMMUNITY
Start: 2024-01-04 | End: 1900-01-01

## 2024-05-15 LAB
ANNOTATION COMMENT IMP: NORMAL
ELECTRONIC SIGNATURE: NORMAL
SERPINA1 GENE MUT TESTED BLD/T: NORMAL

## 2024-05-17 LAB
THYROGLOB AB SERPL-ACNC: <20 IU/ML
THYROGLOB SERPL-MCNC: <0.2 NG/ML
TSH SERPL-ACNC: 0.13 UIU/ML

## 2024-05-31 ENCOUNTER — APPOINTMENT (OUTPATIENT)
Dept: GASTROENTEROLOGY | Facility: CLINIC | Age: 39
End: 2024-05-31

## 2024-06-06 ENCOUNTER — APPOINTMENT (OUTPATIENT)
Dept: DERMATOLOGY | Facility: CLINIC | Age: 39
End: 2024-06-06

## 2024-07-08 ENCOUNTER — NON-APPOINTMENT (OUTPATIENT)
Age: 39
End: 2024-07-08

## 2024-07-09 ENCOUNTER — APPOINTMENT (OUTPATIENT)
Dept: GASTROENTEROLOGY | Facility: CLINIC | Age: 39
End: 2024-07-09
Payer: MEDICAID

## 2024-07-09 VITALS
BODY MASS INDEX: 27.29 KG/M2 | DIASTOLIC BLOOD PRESSURE: 72 MMHG | OXYGEN SATURATION: 98 % | WEIGHT: 154 LBS | HEART RATE: 82 BPM | HEIGHT: 63 IN | SYSTOLIC BLOOD PRESSURE: 111 MMHG

## 2024-07-09 PROCEDURE — 99204 OFFICE O/P NEW MOD 45 MIN: CPT

## 2024-07-09 RX ORDER — MV-MN/FOLATE 11/M.THISTLE/HERB 72.3MCGDFE
200 CAPSULE ORAL
Refills: 0 | Status: ACTIVE | COMMUNITY
Start: 2024-07-09

## 2024-07-19 ENCOUNTER — APPOINTMENT (OUTPATIENT)
Dept: ENDOCRINOLOGY | Facility: CLINIC | Age: 39
End: 2024-07-19

## 2024-07-19 ENCOUNTER — APPOINTMENT (OUTPATIENT)
Dept: PULMONOLOGY | Facility: CLINIC | Age: 39
End: 2024-07-19
Payer: MEDICAID

## 2024-07-19 VITALS
SYSTOLIC BLOOD PRESSURE: 120 MMHG | BODY MASS INDEX: 27.64 KG/M2 | WEIGHT: 156 LBS | DIASTOLIC BLOOD PRESSURE: 78 MMHG | HEIGHT: 63 IN

## 2024-07-19 VITALS
HEIGHT: 63 IN | TEMPERATURE: 97.1 F | SYSTOLIC BLOOD PRESSURE: 130 MMHG | DIASTOLIC BLOOD PRESSURE: 80 MMHG | RESPIRATION RATE: 16 BRPM | BODY MASS INDEX: 27.64 KG/M2 | WEIGHT: 156 LBS

## 2024-07-19 DIAGNOSIS — R05.3 CHRONIC COUGH: ICD-10-CM

## 2024-07-19 DIAGNOSIS — R09.82 POSTNASAL DRIP: ICD-10-CM

## 2024-07-19 LAB
ESTIMATED AVERAGE GLUCOSE: 105 MG/DL
HBA1C MFR BLD HPLC: 5.3 %
TSH SERPL-ACNC: 0.06 UIU/ML

## 2024-07-19 PROCEDURE — 99214 OFFICE O/P EST MOD 30 MIN: CPT

## 2024-07-19 PROCEDURE — 94010 BREATHING CAPACITY TEST: CPT

## 2024-07-19 PROCEDURE — G2211 COMPLEX E/M VISIT ADD ON: CPT | Mod: NC,1L

## 2024-07-19 PROCEDURE — 99214 OFFICE O/P EST MOD 30 MIN: CPT | Mod: 25

## 2024-07-22 LAB
THYROGLOB AB SERPL-ACNC: <20 IU/ML
THYROGLOB SERPL-MCNC: 0.25 NG/ML

## 2024-07-23 ENCOUNTER — APPOINTMENT (OUTPATIENT)
Dept: SURGERY | Facility: CLINIC | Age: 39
End: 2024-07-23
Payer: MEDICAID

## 2024-07-23 DIAGNOSIS — C73 MALIGNANT NEOPLASM OF THYROID GLAND: ICD-10-CM

## 2024-07-23 PROCEDURE — 99214 OFFICE O/P EST MOD 30 MIN: CPT

## 2024-07-23 NOTE — HISTORY OF PRESENT ILLNESS
[de-identified] : 9 1/2  years s/p total thyroidectomy for malignancy. feels fatigued on Synthroid 112 mcg daily. denies dysphagia, hoarseness or new lesions. no changes medically since last visit. last sonogram showed slight enlargement of thyroid bed nodule. TSH and TG low.     I have reviewed all old and new data and available images.

## 2024-07-23 NOTE — PHYSICAL EXAM
[de-identified] : well healed scar [de-identified] : no palpable thyroid bed nodules [Laryngoscopy Performed] : laryngoscopy was performed, see procedure section for findings [Midline] : located in midline position [Normal] : orientation to person, place, and time: normal

## 2024-07-23 NOTE — ASSESSMENT
[FreeTextEntry1] : will observe. blood  tests  per dr monaco.  sonogram pending for later this week. to call next week for results.   patient has been given the opportunity to ask questions, and all of the patient's questions have been answered to their satisfaction.

## 2024-07-26 ENCOUNTER — APPOINTMENT (OUTPATIENT)
Dept: ULTRASOUND IMAGING | Facility: IMAGING CENTER | Age: 39
End: 2024-07-26

## 2024-07-26 PROCEDURE — 76536 US EXAM OF HEAD AND NECK: CPT | Mod: 26

## 2024-07-29 PROBLEM — E03.9 ACQUIRED HYPOTHYROIDISM: Status: ACTIVE | Noted: 2024-07-29

## 2024-08-13 ENCOUNTER — APPOINTMENT (OUTPATIENT)
Dept: DERMATOLOGY | Facility: CLINIC | Age: 39
End: 2024-08-13
Payer: MEDICAID

## 2024-08-13 DIAGNOSIS — L21.9 SEBORRHEIC DERMATITIS, UNSPECIFIED: ICD-10-CM

## 2024-08-13 DIAGNOSIS — L70.0 ACNE VULGARIS: ICD-10-CM

## 2024-08-13 DIAGNOSIS — H01.9 UNSPECIFIED INFLAMMATION OF EYELID: ICD-10-CM

## 2024-08-13 PROCEDURE — 99214 OFFICE O/P EST MOD 30 MIN: CPT

## 2024-08-13 RX ORDER — KETOCONAZOLE 20.5 MG/ML
2 SHAMPOO, SUSPENSION TOPICAL
Qty: 1 | Refills: 11 | Status: ACTIVE | COMMUNITY
Start: 2024-08-13 | End: 1900-01-01

## 2024-08-13 RX ORDER — TRETINOIN 0.5 MG/G
0.05 CREAM TOPICAL
Qty: 1 | Refills: 3 | Status: ACTIVE | COMMUNITY
Start: 2024-08-13 | End: 1900-01-01

## 2024-08-13 NOTE — ASSESSMENT
[FreeTextEntry1] : #Acne vulgaris, face, mild, chronic, flaring - Pt oriented about chronic nature of condition, treatment alternatives, risks and benefits - increase to tretinoin 0.05% cream to face qHS. Recommend starting every third night and increasing as tolerated. SED including irritation.   # Seborrheic Dermatitis, chronic, flaring - Discussed nature of chronic condition, treatment expectations, alternatives, risks and benefits  - Start Ketoconazole 2% shampoo, use in place of regular shampoo with each washing, use at least twice a week. Leave on for 5-10 min then rinse. SED.    # Eyelid dermatitis, chronic, flaring new diagnosis with uncertain prognosis - favor ACD vs other eczematous dermatitis - advised to be mindful of cosmetics that may be worsening her rash - consider patch testing in the future if it does not improve with topicals Start tacrolimus 0.1% ointment to AA BID. SED including irritation and boxed warning. May mix with vaseline if experiencing stinging.  # Hx of hives - not flaring today - pt plans to see AI to discuss further.   RTC 3 mo

## 2024-08-13 NOTE — HISTORY OF PRESENT ILLNESS
[de-identified] : Ms. BING CASTRO  is a 39 year  y/o F  here for evaluation of below   # acne on face for years. used tret 0.05% as a spot treatment with good results. tried 0.025% also as a spot treatment without improvement. interested in going up to the 0.05% dose.   # eyelid rash and itch on/off for 4 years. uses cosmetics and false lashes for years. also gets artificial nails.   # Gets hives on and off. more frequent this summer. plans to see an AI specialist soon.   # scalp dandruff on/off for years. only tried keto shampo one time. not using any tx.    No other changing or concerning lesions. No itchy, growing, bleeding, painful, or changing moles.  Personal hx of skin cancer: no FHx of skin cancer: no Social Hx: her son is also a patient of Dr. Rodrigez's   [FreeTextEntry1] : fu- spots

## 2024-08-13 NOTE — HISTORY OF PRESENT ILLNESS
[FreeTextEntry1] : fu- spots [de-identified] : Ms. BING CASTRO  is a 39 year  y/o F  here for evaluation of below   # acne on face for years. used tret 0.05% as a spot treatment with good results. tried 0.025% also as a spot treatment without improvement. interested in going up to the 0.05% dose.   # eyelid rash and itch on/off for 4 years. uses cosmetics and false lashes for years. also gets artificial nails.   # Gets hives on and off. more frequent this summer. plans to see an AI specialist soon.   # scalp dandruff on/off for years. only tried keto shampo one time. not using any tx.    No other changing or concerning lesions. No itchy, growing, bleeding, painful, or changing moles.  Personal hx of skin cancer: no FHx of skin cancer: no Social Hx: her son is also a patient of Dr. Rodrigez's

## 2024-08-13 NOTE — PHYSICAL EXAM
[FreeTextEntry3] : AAOx3, NAD, well-appearing / pleasant Focused body exam only (see below) per patient request: mild comedones on lower face loose scale on scalp bilateral eyelid pink erythema and mild edema

## 2024-08-15 RX ORDER — TACROLIMUS 1 MG/G
0.1 OINTMENT TOPICAL
Qty: 1 | Refills: 2 | Status: ACTIVE | COMMUNITY
Start: 2024-08-13 | End: 1900-01-01

## 2024-08-19 ENCOUNTER — NON-APPOINTMENT (OUTPATIENT)
Age: 39
End: 2024-08-19

## 2024-08-19 RX ORDER — PIMECROLIMUS 10 MG/G
1 CREAM TOPICAL TWICE DAILY
Qty: 1 | Refills: 2 | Status: ACTIVE | COMMUNITY
Start: 2024-08-19 | End: 1900-01-01

## 2024-08-20 ENCOUNTER — APPOINTMENT (OUTPATIENT)
Dept: GASTROENTEROLOGY | Facility: CLINIC | Age: 39
End: 2024-08-20
Payer: MEDICAID

## 2024-08-20 VITALS
HEIGHT: 62 IN | SYSTOLIC BLOOD PRESSURE: 122 MMHG | WEIGHT: 156 LBS | OXYGEN SATURATION: 95 % | HEART RATE: 88 BPM | DIASTOLIC BLOOD PRESSURE: 82 MMHG | BODY MASS INDEX: 28.71 KG/M2

## 2024-08-20 DIAGNOSIS — K59.00 CONSTIPATION, UNSPECIFIED: ICD-10-CM

## 2024-08-20 PROCEDURE — 99214 OFFICE O/P EST MOD 30 MIN: CPT

## 2024-08-20 NOTE — HISTORY OF PRESENT ILLNESS
[FreeTextEntry1] : Pt was seen about 2 weeks ago and was given Benefiber for constipation, although she admitted that she only goes a couple of days without a BM. Pt tried Benefiber which worked for a couple of days and stopped working. Everything else that I questioned her about, including MiraLAX, Colace, mag citrate, all had the same effect of only working for a few days.

## 2024-08-20 NOTE — ASSESSMENT
[FreeTextEntry1] : Pt was seen about 2 weeks ago and was given Benefiber for constipation, although she admitted that she only goes a couple of days without a BM. Pt tried Benefiber which worked for a couple of days and stopped working. Everything else that I questioned her about, including MiraLAX, Colace, mag citrate, all had the same effect of only working for a few days.   Plan: Told to use Mag Citrate until we obtain an ARM. doubt colonic inertia since pt has having regular BM.  I spent 30 min with the pt in the exam room.

## 2024-08-26 ENCOUNTER — APPOINTMENT (OUTPATIENT)
Dept: PEDIATRIC ALLERGY IMMUNOLOGY | Facility: CLINIC | Age: 39
End: 2024-08-26
Payer: MEDICAID

## 2024-08-26 ENCOUNTER — LABORATORY RESULT (OUTPATIENT)
Age: 39
End: 2024-08-26

## 2024-08-26 VITALS
BODY MASS INDEX: 28.75 KG/M2 | WEIGHT: 156.25 LBS | OXYGEN SATURATION: 97 % | HEART RATE: 80 BPM | HEIGHT: 62 IN | DIASTOLIC BLOOD PRESSURE: 80 MMHG | SYSTOLIC BLOOD PRESSURE: 111 MMHG

## 2024-08-26 DIAGNOSIS — J32.9 CHRONIC SINUSITIS, UNSPECIFIED: ICD-10-CM

## 2024-08-26 DIAGNOSIS — R09.82 POSTNASAL DRIP: ICD-10-CM

## 2024-08-26 DIAGNOSIS — R05.3 CHRONIC COUGH: ICD-10-CM

## 2024-08-26 DIAGNOSIS — J33.8 OTHER POLYP OF SINUS: ICD-10-CM

## 2024-08-26 DIAGNOSIS — L50.8 OTHER URTICARIA: ICD-10-CM

## 2024-08-26 DIAGNOSIS — J32.0 CHRONIC MAXILLARY SINUSITIS: ICD-10-CM

## 2024-08-26 DIAGNOSIS — T78.1XXA OTHER ADVERSE FOOD REACTIONS, NOT ELSEWHERE CLASSIFIED, INITIAL ENCOUNTER: ICD-10-CM

## 2024-08-26 DIAGNOSIS — J30.1 ALLERGIC RHINITIS DUE TO POLLEN: ICD-10-CM

## 2024-08-26 DIAGNOSIS — J30.89 OTHER ALLERGIC RHINITIS: ICD-10-CM

## 2024-08-26 PROCEDURE — 95018 ALL TSTG PERQ&IQ DRUGS/BIOL: CPT

## 2024-08-26 PROCEDURE — 99204 OFFICE O/P NEW MOD 45 MIN: CPT | Mod: 25

## 2024-08-26 RX ORDER — FLUTICASONE PROPIONATE 93 UG/1
93 SPRAY, METERED NASAL
Qty: 1 | Refills: 5 | Status: ACTIVE | COMMUNITY
Start: 2024-08-26 | End: 1900-01-01

## 2024-08-26 NOTE — HISTORY OF PRESENT ILLNESS
[Eczematous rashes] : eczematous rashes [Venom Reactions] : venom reactions [de-identified] : BING CASTRO is a 39 year  old female with history of papillary thyroid cancer, hyperlipidemia, chronic cough , Chronic Rhinosinusitis, S/p ESS 5/2023  presents for allergy evaluation.  She has had hives for on and off since last year- summer 2023. - She gets hives 1-2 times a week. She takes [facility] Loratidine or Diphenhydramine/Benadryl  with relief. She does get pressure hives on her feet, waits and interior thighs and under the bra - There is no association of hives with the type of food eaten or specific activities/exposures. Sometimes it occurres within minutes after meals  but she also wakes up with hives sometimes.   - Hives are fleeting and individual hives  typically last few hours- to up to a day. Hives occur 1-2 a week.  - She had lip swelling once in the evening, she noticed it before she fell asleep. She  denies associated shortness of breath or other allergic symptoms.  - She doesnt take  NSAIDs frequently, hasnt noticed worsening of hives.  - She tried Loratidine and Diphenhydramine/Benadryl  - Cold doesnt trigger hives. She does get "lines after shower"  She was recently diagnosed with contact dermatitis, started on topical Tacro.   She has Oral Allergy Syndrome (OAS) - itchy throat and skin with apples, almonds, avocados (sometimes), peaches, strawberries. She eats almonds, hazelnut, pistachios and bananas.  She avoids apples, strawberries.  She can tolerate fruit cooked.   She does have environmental allergies. Her symptoms are perennial.  - she is followed by Dr LOMELI, last visit 11/2023: Nasal endoscopy - normal at that time. - Sinus CT 3/2023: Mild sinus mucosal thickening with prominent left maxillary sinus polyp or retention cyst. Nasal septal deviation.  - 5/2023- Bilateral functional endoscopic sinus surgery, bilateral endoscopic ethmoidectomy, bilateral endoscopic  maxillary antrostomy with removal of tissue, septoplasty, and bilateral turbinoplasty;  post -op Dx- Chronic sinusitis, deviated nasal septum, and hypertrophied turbinates. - Sinus pat: Chronic sinusitis and tiny inflammatory polyp  She has chronic cough, followed by Dr East. She  been prescribed Symbicort and albuterol in the winter. She is seeing a pulmonologist, methacholine challenge is planned. 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

## 2024-08-26 NOTE — ASSESSMENT
[FreeTextEntry1] :   39 year old female with history of papillary thyroid cancer, hyperlipidemia, chronic cough , Chronic Rhinosinusitis, S/p ESS 5/2023, periocular dermatitis, presents for evaluation of recurrent urticaria, cough, environmental and food allergies.  INTERMITTENT URTICARIA  with one episode of lip swelling in the setting of thyroidectomy for history of papillary thyroid cancer,  Recurrent random mild hives without an identifiable trigger, are most likely a manifestation of chronic mild  intermittent urticaria.  The use of oral antihistamines was recommended and the risks, benefits, and alternatives of use were discussed including counseling about the possible sedative properties of medications.   The natural history of chronic intermittent urticaria discussed and laboratory testing was sent as described below. As her symptoms are mild and intermittent, recommend continuing  using antihistamines such as Loratidine 10 mg, Start it  at the onset of hives and continue for 2-3 days or longer if necessary.  COUGH is likely multifactorial. Most common etiologies of cough include postnasal drip due to Chronic Rhinosinusitis and  allergic rhinitis, asthma and GERD.  - plan for methacholine challenge by Dr East  Mild CHRONIC RHINOSINUSITIS WITH tiny inflammatory polyp (path 5/2023), S/p FESS 5/2023: Atopy, elevated IgE  suggest presence of Type 2 inflammation.  Chronic Rhinosinusitis likely contributes to persistent cough Given the history of Chronic Rhinosinusitis, laboratory testing was sent as stated below and results will be discussed when available. Blood was collected in the office.  - trial of Xhance  ALLERGIC RHINITIS with sensitivity to dust mites, dove tree, grass pollen, ?mold Information and education regarding environmental avoidance and control measures for environmental allergens provided. Options of treatment discussed. Proper technique of using nasal spray discussed and demonstrated. Trial of intranasal steroid and second generation antihistamine as ordered. Immunotherapy discussed as an option to treat allergic rhinitis with risks, benefits and alternatives explained.  Printed handout provided.   ORAL ALLERGY SYNDROME (OAS) -OAS occurs secondary to the cross reactivity of pollen proteins with specific fresh fruits and vegetables due to conserved homologous regions in the proteins. OAS is an IgE mediated process where ingestion of certain fresh fruits, vegetables, nuts or spices can illicit  localized clinical symptoms in pollen-sensitized individuals. - There are no restriction to eating fruits/ vegetables after cooking/ heating.  - patient has minimal symptoms when eating almonds but keeps eating them without worsening symptoms. She is not interested in  Epinephrine Autoinjector   F/up in 2-3 months: - trial of Xhance - consider Immunotherapy  - f/up urticaria intermittent 1-2/week

## 2024-08-26 NOTE — PHYSICAL EXAM
[Alert] : alert [Well Nourished] : well nourished [Healthy Appearance] : healthy appearance [No Acute Distress] : no acute distress [Well Developed] : well developed [Normal Pupil & Iris Size/Symmetry] : normal pupil and iris size and symmetry [No Discharge] : no discharge [No Photophobia] : no photophobia [Sclera Not Icteric] : sclera not icteric [Normal TMs] : both tympanic membranes were normal [Normal Lips/Tongue] : the lips and tongue were normal [Normal Outer Ear/Nose] : the ears and nose were normal in appearance [No Thrush] : no thrush [Pale mucosa] : pale mucosa [Boggy Nasal Turbinates] : boggy and/or pale nasal turbinates [Posterior Pharyngeal Cobblestoning] : posterior pharyngeal cobblestoning [Clear Rhinorrhea] : clear rhinorrhea was seen [Supple] : the neck was supple [Normal Rate and Effort] : normal respiratory rhythm and effort [No Crackles] : no crackles [Bilateral Audible Breath Sounds] : bilateral audible breath sounds [Normal Rate] : heart rate was normal  [Normal S1, S2] : normal S1 and S2 [Regular Rhythm] : with a regular rhythm [Soft] : abdomen soft [Not Tender] : non-tender [No HSM] : no hepato-splenomegaly [Normal Cervical Lymph Nodes] : cervical [No Skin Lesions] : no skin lesions [No clubbing] : no clubbing [No Edema] : no edema [No Cyanosis] : no cyanosis [Normal Mood] : mood was normal [Normal Affect] : affect was normal [Alert, Awake, Oriented as Age-Appropriate] : alert, awake, oriented as age appropriate [Pharyngeal erythema] : no pharyngeal erythema [Exudate] : no exudate [Wheezing] : no wheezing was heard [Urticaria] : no urticaria [Dermatographism] : no dermatographism

## 2024-08-26 NOTE — CONSULT LETTER
[Dear  ___] : Dear  [unfilled], [Consult Letter:] : I had the pleasure of evaluating your patient, [unfilled]. [Please see my note below.] : Please see my note below. [Consult Closing:] : Thank you very much for allowing me to participate in the care of this patient.  If you have any questions, please do not hesitate to contact me. [Sincerely,] : Sincerely, [FreeTextEntry3] : MD LIDIA Arvizu, [facility] Pediatric All[facility], Asthma and Clinical Immunology Associate Professor of Medicine and Pediatrics at   [facility], Adult Allergy and Immunology   [facility]   Division of Allergy, Asthma and Immunology   [address] [place] [address], [place] [address]   Phone [phone]  Fax [phone]    [DrBebeto  ___] : Dr. LEW [DrBebeto ___] : Dr. LEW

## 2024-08-26 NOTE — REVIEW OF SYSTEMS
[Nasal Congestion] : nasal congestion [Post Nasal Drip] : post nasal drip [Cough] : cough [Urticaria] : urticaria [Swelling] : swelling [Fever] : no fever [Recurrent Throat Infections] : no recurrence of throat infections [Recurrent Bronchitis] : no recurrent bronchitis [Recurrent Ear Infections] : no recurrence or ear infections [Recurrent Pneumonia] : no ~T recurrent pneumonia [de-identified] : Chronic Rhinosinusitis

## 2024-08-28 ENCOUNTER — NON-APPOINTMENT (OUTPATIENT)
Age: 39
End: 2024-08-28

## 2024-08-29 ENCOUNTER — APPOINTMENT (OUTPATIENT)
Dept: PULMONOLOGY | Facility: CLINIC | Age: 39
End: 2024-08-29

## 2024-08-29 LAB
ALBUMIN MFR SERPL ELPH: 54.6 %
ALBUMIN SERPL-MCNC: 4.4 G/DL
ALBUMIN/GLOB SERPL: 1.2 RATIO
ALPHA1 GLOB MFR SERPL ELPH: 3.1 %
ALPHA1 GLOB SERPL ELPH-MCNC: 0.2 G/DL
ALPHA2 GLOB MFR SERPL ELPH: 8.4 %
ALPHA2 GLOB SERPL ELPH-MCNC: 0.7 G/DL
B-GLOBULIN MFR SERPL ELPH: 13.7 %
B-GLOBULIN SERPL ELPH-MCNC: 1.1 G/DL
BASOPHILS # BLD AUTO: 0.05 K/UL
BASOPHILS NFR BLD AUTO: 0.7 %
C LUNATA IGE QN: <0.1 KUA/L
CH50 SERPL-MCNC: 57 U/ML
CMN PIGWEED IGE QN: <0.1 KUA/L
COCKLEBUR IGE QN: 0.16 KUA/L
COMMON RAGWEED IGE QN: 0.15 KUA/L
COMPLEMENT, ALTERNATE PATHWAY (AH50): 102
DEPRECATED A PULLULANS IGE RAST QL: 0
DEPRECATED C LUNATA IGE RAST QL: 0
DEPRECATED COCKLEBUR IGE RAST QL: NORMAL
DEPRECATED COMMON PIGWEED IGE RAST QL: 0 (ref 0–?)
DEPRECATED COMMON RAGWEED IGE RAST QL: NORMAL (ref 0–?)
DEPRECATED ENGL PLANTAIN IGE RAST QL: 0
DEPRECATED F MONILIFORME IGE RAST QL: 0
DEPRECATED GIANT RAGWEED IGE RAST QL: NORMAL
DEPRECATED GOOSEFOOT IGE RAST QL: NORMAL (ref 0–?)
DEPRECATED KAPPA LC FREE/LAMBDA SER: 1.56 RATIO
DEPRECATED MUGWORT IGE RAST QL: 0 (ref 0–?)
DEPRECATED P NOTATUM IGE RAST QL: 0 (ref 0–?)
DEPRECATED R NIGRICANS IGE RAST QL: 0
DEPRECATED SALTWORT IGE RAST QL: NORMAL
ENGL PLANTAIN IGE QN: <0.1 KUA/L
EOSINOPHIL # BLD AUTO: 0.34 K/UL
EOSINOPHIL NFR BLD AUTO: 4.9 %
F MONILIFORME IGE QN: <0.1 KUA/L
GAMMA GLOB FLD ELPH-MCNC: 1.6 G/DL
GAMMA GLOB MFR SERPL ELPH: 20.2 %
GIANT RAGWEED IGE QN: 0.11 KUA/L
GOOSEFOOT IGE QN: 0.28 KUA/L
HCT VFR BLD CALC: 38.8 %
HGB BLD-MCNC: 12.1 G/DL
IGA SER QL IEP: 344 MG/DL
IGG SER QL IEP: 1663 MG/DL
IGM SER QL IEP: 106 MG/DL
IMM GRANULOCYTES NFR BLD AUTO: 0.4 %
INTERPRETATION SERPL IEP-IMP: NORMAL
KAPPA LC CSF-MCNC: 1.99 MG/DL
KAPPA LC SERPL-MCNC: 3.11 MG/DL
LYMPHOCYTES # BLD AUTO: 2.23 K/UL
LYMPHOCYTES NFR BLD AUTO: 31.9 %
M PROTEIN SPEC IFE-MCNC: NORMAL
MAN DIFF?: NORMAL
MANNAN BINDING LECTIN (MBL): 20 NG/ML
MCHC RBC-ENTMCNC: 27.3 PG
MCHC RBC-ENTMCNC: 31.2 GM/DL
MCV RBC AUTO: 87.4 FL
MOLD (AUREOBASIDIUM M12) CONC: <0.1 KUA/L
MONOCYTES # BLD AUTO: 0.45 K/UL
MONOCYTES NFR BLD AUTO: 6.4 %
MUGWORT IGE QN: <0.1 KUA/L
NEUTROPHILS # BLD AUTO: 3.89 K/UL
NEUTROPHILS NFR BLD AUTO: 55.7 %
P NOTATUM IGE QN: <0.1 KUA/L
PLATELET # BLD AUTO: 398 K/UL
PROT SERPL-MCNC: 8 G/DL
PROT SERPL-MCNC: 8 G/DL
R NIGRICANS IGE QN: <0.1 KUA/L
RBC # BLD: 4.44 M/UL
RBC # FLD: 13.8 %
SALTWORT IGE QN: 0.2 KUA/L
TOTAL IGE SMQN RAST: 727 KU/L
TRYPTASE: 7.8 UG/L
WBC # FLD AUTO: 6.99 K/UL

## 2024-09-03 LAB
CREATININE RANDOM URINE: 173 MG/DL
LEUKOTRIENE E4, URINE: 119 PG/MG CR

## 2024-09-05 ENCOUNTER — OUTPATIENT (OUTPATIENT)
Dept: OUTPATIENT SERVICES | Facility: HOSPITAL | Age: 39
LOS: 1 days | End: 2024-09-05
Payer: MEDICAID

## 2024-09-05 ENCOUNTER — APPOINTMENT (OUTPATIENT)
Dept: SLEEP CENTER | Facility: CLINIC | Age: 39
End: 2024-09-05
Payer: MEDICAID

## 2024-09-05 DIAGNOSIS — Z98.890 OTHER SPECIFIED POSTPROCEDURAL STATES: Chronic | ICD-10-CM

## 2024-09-05 DIAGNOSIS — Z64.0 PROBLEMS RELATED TO UNWANTED PREGNANCY: Chronic | ICD-10-CM

## 2024-09-05 DIAGNOSIS — Z98.89 OTHER SPECIFIED POSTPROCEDURAL STATES: Chronic | ICD-10-CM

## 2024-09-05 DIAGNOSIS — E89.0 POSTPROCEDURAL HYPOTHYROIDISM: Chronic | ICD-10-CM

## 2024-09-05 PROCEDURE — 95800 SLP STDY UNATTENDED: CPT | Mod: 26

## 2024-09-05 PROCEDURE — 95800 SLP STDY UNATTENDED: CPT

## 2024-09-06 ENCOUNTER — LABORATORY RESULT (OUTPATIENT)
Age: 39
End: 2024-09-06

## 2024-09-06 LAB — CHRONIC URTICARIA PANEL (CU INDEX): <2.5

## 2024-09-09 ENCOUNTER — APPOINTMENT (OUTPATIENT)
Dept: PULMONOLOGY | Facility: CLINIC | Age: 39
End: 2024-09-09
Payer: MEDICAID

## 2024-09-09 DIAGNOSIS — G47.30 SLEEP APNEA, UNSPECIFIED: ICD-10-CM

## 2024-09-09 PROCEDURE — 99443: CPT

## 2024-09-13 ENCOUNTER — NON-APPOINTMENT (OUTPATIENT)
Age: 39
End: 2024-09-13

## 2024-09-17 DIAGNOSIS — E03.9 HYPOTHYROIDISM, UNSPECIFIED: ICD-10-CM

## 2024-09-17 DIAGNOSIS — C73 MALIGNANT NEOPLASM OF THYROID GLAND: ICD-10-CM

## 2024-09-17 DIAGNOSIS — G47.33 OBSTRUCTIVE SLEEP APNEA (ADULT) (PEDIATRIC): ICD-10-CM

## 2024-09-18 ENCOUNTER — APPOINTMENT (OUTPATIENT)
Dept: GASTROENTEROLOGY | Facility: HOSPITAL | Age: 39
End: 2024-09-18

## 2024-09-18 ENCOUNTER — OUTPATIENT (OUTPATIENT)
Dept: OUTPATIENT SERVICES | Facility: HOSPITAL | Age: 39
LOS: 1 days | Discharge: HOME | End: 2024-09-18
Payer: MEDICAID

## 2024-09-18 VITALS
HEART RATE: 75 BPM | TEMPERATURE: 98 F | DIASTOLIC BLOOD PRESSURE: 85 MMHG | SYSTOLIC BLOOD PRESSURE: 107 MMHG | WEIGHT: 156.09 LBS | HEIGHT: 63 IN | RESPIRATION RATE: 18 BRPM

## 2024-09-18 DIAGNOSIS — Z64.0 PROBLEMS RELATED TO UNWANTED PREGNANCY: Chronic | ICD-10-CM

## 2024-09-18 DIAGNOSIS — K59.00 CONSTIPATION, UNSPECIFIED: ICD-10-CM

## 2024-09-18 DIAGNOSIS — E89.0 POSTPROCEDURAL HYPOTHYROIDISM: Chronic | ICD-10-CM

## 2024-09-18 DIAGNOSIS — Z98.890 OTHER SPECIFIED POSTPROCEDURAL STATES: Chronic | ICD-10-CM

## 2024-09-18 DIAGNOSIS — Z98.89 OTHER SPECIFIED POSTPROCEDURAL STATES: Chronic | ICD-10-CM

## 2024-09-18 PROCEDURE — 91122 ANORECTAL MANOMETRY: CPT | Mod: 26

## 2024-09-18 PROCEDURE — 91120: CPT | Mod: 26

## 2024-09-24 ENCOUNTER — NON-APPOINTMENT (OUTPATIENT)
Age: 39
End: 2024-09-24

## 2024-10-08 ENCOUNTER — APPOINTMENT (OUTPATIENT)
Dept: OBGYN | Facility: CLINIC | Age: 39
End: 2024-10-08
Payer: MEDICAID

## 2024-10-08 ENCOUNTER — LABORATORY RESULT (OUTPATIENT)
Age: 39
End: 2024-10-08

## 2024-10-08 ENCOUNTER — OUTPATIENT (OUTPATIENT)
Dept: OUTPATIENT SERVICES | Facility: HOSPITAL | Age: 39
LOS: 1 days | End: 2024-10-08
Payer: MEDICAID

## 2024-10-08 VITALS — DIASTOLIC BLOOD PRESSURE: 80 MMHG | SYSTOLIC BLOOD PRESSURE: 122 MMHG | WEIGHT: 154 LBS | BODY MASS INDEX: 28.17 KG/M2

## 2024-10-08 DIAGNOSIS — Z64.0 PROBLEMS RELATED TO UNWANTED PREGNANCY: Chronic | ICD-10-CM

## 2024-10-08 DIAGNOSIS — N76.0 ACUTE VAGINITIS: ICD-10-CM

## 2024-10-08 DIAGNOSIS — Z98.89 OTHER SPECIFIED POSTPROCEDURAL STATES: Chronic | ICD-10-CM

## 2024-10-08 DIAGNOSIS — Z98.890 OTHER SPECIFIED POSTPROCEDURAL STATES: Chronic | ICD-10-CM

## 2024-10-08 DIAGNOSIS — Z12.4 ENCOUNTER FOR SCREENING FOR MALIGNANT NEOPLASM OF CERVIX: ICD-10-CM

## 2024-10-08 DIAGNOSIS — Z11.3 ENCOUNTER FOR SCREENING FOR INFECTIONS WITH A PREDOMINANTLY SEXUAL MODE OF TRANSMISSION: ICD-10-CM

## 2024-10-08 DIAGNOSIS — E89.0 POSTPROCEDURAL HYPOTHYROIDISM: Chronic | ICD-10-CM

## 2024-10-08 PROCEDURE — 88175 CYTOPATH C/V AUTO FLUID REDO: CPT

## 2024-10-08 PROCEDURE — 87591 N.GONORRHOEAE DNA AMP PROB: CPT

## 2024-10-08 PROCEDURE — 88141 CYTOPATH C/V INTERPRET: CPT

## 2024-10-08 PROCEDURE — G0463: CPT

## 2024-10-08 PROCEDURE — 87491 CHLMYD TRACH DNA AMP PROBE: CPT

## 2024-10-08 PROCEDURE — 99213 OFFICE O/P EST LOW 20 MIN: CPT

## 2024-10-08 PROCEDURE — 87624 HPV HI-RISK TYP POOLED RSLT: CPT

## 2024-10-14 LAB — CYTOLOGY SPEC DOC CYTO: SIGNIFICANT CHANGE UP

## 2024-10-17 ENCOUNTER — LABORATORY RESULT (OUTPATIENT)
Age: 39
End: 2024-10-17

## 2024-10-17 ENCOUNTER — APPOINTMENT (OUTPATIENT)
Dept: CARDIOLOGY | Facility: CLINIC | Age: 39
End: 2024-10-17
Payer: MEDICAID

## 2024-10-17 VITALS
SYSTOLIC BLOOD PRESSURE: 108 MMHG | RESPIRATION RATE: 16 BRPM | TEMPERATURE: 98.2 F | WEIGHT: 150 LBS | OXYGEN SATURATION: 96 % | HEIGHT: 62 IN | BODY MASS INDEX: 27.6 KG/M2 | DIASTOLIC BLOOD PRESSURE: 70 MMHG | HEART RATE: 105 BPM

## 2024-10-17 DIAGNOSIS — E78.5 HYPERLIPIDEMIA, UNSPECIFIED: ICD-10-CM

## 2024-10-17 DIAGNOSIS — E78.1 PURE HYPERGLYCERIDEMIA: ICD-10-CM

## 2024-10-17 DIAGNOSIS — R73.03 PREDIABETES.: ICD-10-CM

## 2024-10-17 PROCEDURE — 99214 OFFICE O/P EST MOD 30 MIN: CPT

## 2024-10-17 PROCEDURE — G2211 COMPLEX E/M VISIT ADD ON: CPT | Mod: NC

## 2024-10-18 ENCOUNTER — RX RENEWAL (OUTPATIENT)
Age: 39
End: 2024-10-18

## 2024-10-21 ENCOUNTER — RESULT CHARGE (OUTPATIENT)
Age: 39
End: 2024-10-21

## 2024-10-22 ENCOUNTER — APPOINTMENT (OUTPATIENT)
Dept: PULMONOLOGY | Facility: CLINIC | Age: 39
End: 2024-10-22

## 2024-10-22 VITALS
OXYGEN SATURATION: 100 % | HEART RATE: 72 BPM | WEIGHT: 153 LBS | SYSTOLIC BLOOD PRESSURE: 110 MMHG | BODY MASS INDEX: 28.16 KG/M2 | DIASTOLIC BLOOD PRESSURE: 75 MMHG | HEIGHT: 62 IN

## 2024-10-22 PROCEDURE — 95070 INHLJ BRNCL CHALLENGE TSTG: CPT

## 2024-10-22 PROCEDURE — 94070 EVALUATION OF WHEEZING: CPT

## 2024-10-30 ENCOUNTER — APPOINTMENT (OUTPATIENT)
Dept: PEDIATRIC ALLERGY IMMUNOLOGY | Facility: CLINIC | Age: 39
End: 2024-10-30
Payer: MEDICAID

## 2024-10-30 VITALS
HEART RATE: 79 BPM | OXYGEN SATURATION: 95 % | HEIGHT: 62 IN | SYSTOLIC BLOOD PRESSURE: 118 MMHG | BODY MASS INDEX: 28.04 KG/M2 | WEIGHT: 152.38 LBS | DIASTOLIC BLOOD PRESSURE: 82 MMHG

## 2024-10-30 DIAGNOSIS — J32.9 CHRONIC SINUSITIS, UNSPECIFIED: ICD-10-CM

## 2024-10-30 DIAGNOSIS — D89.89 OTHER SPECIFIED DISORDERS INVOLVING THE IMMUNE MECHANISM, NOT ELSEWHERE CLASSIFIED: ICD-10-CM

## 2024-10-30 DIAGNOSIS — L50.8 OTHER URTICARIA: ICD-10-CM

## 2024-10-30 DIAGNOSIS — J30.89 OTHER ALLERGIC RHINITIS: ICD-10-CM

## 2024-10-30 DIAGNOSIS — R05.3 CHRONIC COUGH: ICD-10-CM

## 2024-10-30 PROCEDURE — 99214 OFFICE O/P EST MOD 30 MIN: CPT

## 2024-10-30 RX ORDER — LEVOCETIRIZINE DIHYDROCHLORIDE 5 MG/1
5 TABLET ORAL
Qty: 30 | Refills: 5 | Status: ACTIVE | COMMUNITY
Start: 2024-10-30 | End: 1900-01-01

## 2024-10-30 RX ORDER — OLOPATADINE HYDROCHLORIDE AND MOMETASONE FUROATE 25; 665 UG/1; UG/1
665-25 SPRAY, METERED NASAL
Qty: 1 | Refills: 2 | Status: ACTIVE | COMMUNITY
Start: 2024-10-30 | End: 1900-01-01

## 2024-11-12 ENCOUNTER — APPOINTMENT (OUTPATIENT)
Dept: DERMATOLOGY | Facility: CLINIC | Age: 39
End: 2024-11-12

## 2024-11-25 ENCOUNTER — NON-APPOINTMENT (OUTPATIENT)
Age: 39
End: 2024-11-25

## 2024-11-29 ENCOUNTER — APPOINTMENT (OUTPATIENT)
Dept: PULMONOLOGY | Facility: CLINIC | Age: 39
End: 2024-11-29
Payer: MEDICAID

## 2024-11-29 VITALS — HEIGHT: 62 IN | BODY MASS INDEX: 27.6 KG/M2 | WEIGHT: 150 LBS

## 2024-11-29 DIAGNOSIS — R93.89 ABNORMAL FINDINGS ON DIAGNOSTIC IMAGING OF OTHER SPECIFIED BODY STRUCTURES: ICD-10-CM

## 2024-11-29 DIAGNOSIS — D72.10 EOSINOPHILIA, UNSPECIFIED: ICD-10-CM

## 2024-11-29 DIAGNOSIS — K21.9 GASTRO-ESOPHAGEAL REFLUX DISEASE W/OUT ESOPHAGITIS: ICD-10-CM

## 2024-11-29 DIAGNOSIS — J30.89 OTHER ALLERGIC RHINITIS: ICD-10-CM

## 2024-11-29 DIAGNOSIS — R76.8 OTHER SPECIFIED ABNORMAL IMMUNOLOGICAL FINDINGS IN SERUM: ICD-10-CM

## 2024-11-29 DIAGNOSIS — R06.02 SHORTNESS OF BREATH: ICD-10-CM

## 2024-11-29 PROCEDURE — 99214 OFFICE O/P EST MOD 30 MIN: CPT

## 2024-12-09 ENCOUNTER — APPOINTMENT (OUTPATIENT)
Dept: PEDIATRIC ALLERGY IMMUNOLOGY | Facility: CLINIC | Age: 39
End: 2024-12-09

## 2024-12-18 ENCOUNTER — LABORATORY RESULT (OUTPATIENT)
Age: 39
End: 2024-12-18

## 2024-12-18 ENCOUNTER — OUTPATIENT (OUTPATIENT)
Dept: OUTPATIENT SERVICES | Facility: HOSPITAL | Age: 39
LOS: 1 days | End: 2024-12-18
Payer: MEDICAID

## 2024-12-18 ENCOUNTER — APPOINTMENT (OUTPATIENT)
Dept: OBGYN | Facility: CLINIC | Age: 39
End: 2024-12-18
Payer: MEDICAID

## 2024-12-18 ENCOUNTER — APPOINTMENT (OUTPATIENT)
Dept: DERMATOLOGY | Facility: CLINIC | Age: 39
End: 2024-12-18
Payer: MEDICAID

## 2024-12-18 VITALS — BODY MASS INDEX: 27.98 KG/M2 | SYSTOLIC BLOOD PRESSURE: 120 MMHG | DIASTOLIC BLOOD PRESSURE: 68 MMHG | WEIGHT: 153 LBS

## 2024-12-18 VITALS — BODY MASS INDEX: 27.6 KG/M2 | HEIGHT: 62 IN | WEIGHT: 150 LBS

## 2024-12-18 DIAGNOSIS — H01.9 UNSPECIFIED INFLAMMATION OF EYELID: ICD-10-CM

## 2024-12-18 DIAGNOSIS — Z11.3 ENCOUNTER FOR SCREENING FOR INFECTIONS WITH A PREDOMINANTLY SEXUAL MODE OF TRANSMISSION: ICD-10-CM

## 2024-12-18 DIAGNOSIS — L70.0 ACNE VULGARIS: ICD-10-CM

## 2024-12-18 DIAGNOSIS — Z98.890 OTHER SPECIFIED POSTPROCEDURAL STATES: Chronic | ICD-10-CM

## 2024-12-18 DIAGNOSIS — Z01.419 ENCOUNTER FOR GYNECOLOGICAL EXAMINATION (GENERAL) (ROUTINE) W/OUT ABNORMAL FINDINGS: ICD-10-CM

## 2024-12-18 DIAGNOSIS — N89.8 OTHER SPECIFIED NONINFLAMMATORY DISORDERS OF VAGINA: ICD-10-CM

## 2024-12-18 DIAGNOSIS — N94.89 OTHER SPECIFIED CONDITIONS ASSOCIATED WITH FEMALE GENITAL ORGANS AND MENSTRUAL CYCLE: ICD-10-CM

## 2024-12-18 DIAGNOSIS — Z98.89 OTHER SPECIFIED POSTPROCEDURAL STATES: Chronic | ICD-10-CM

## 2024-12-18 DIAGNOSIS — E89.0 POSTPROCEDURAL HYPOTHYROIDISM: Chronic | ICD-10-CM

## 2024-12-18 DIAGNOSIS — Z64.0 PROBLEMS RELATED TO UNWANTED PREGNANCY: Chronic | ICD-10-CM

## 2024-12-18 DIAGNOSIS — N92.6 IRREGULAR MENSTRUATION, UNSPECIFIED: ICD-10-CM

## 2024-12-18 DIAGNOSIS — B37.31 ACUTE CANDIDIASIS OF VULVA AND VAGINA: ICD-10-CM

## 2024-12-18 DIAGNOSIS — N76.0 ACUTE VAGINITIS: ICD-10-CM

## 2024-12-18 PROCEDURE — 86780 TREPONEMA PALLIDUM: CPT

## 2024-12-18 PROCEDURE — 88175 CYTOPATH C/V AUTO FLUID REDO: CPT

## 2024-12-18 PROCEDURE — 87624 HPV HI-RISK TYP POOLED RSLT: CPT

## 2024-12-18 PROCEDURE — 87481 CANDIDA DNA AMP PROBE: CPT

## 2024-12-18 PROCEDURE — G0463: CPT

## 2024-12-18 PROCEDURE — 86803 HEPATITIS C AB TEST: CPT

## 2024-12-18 PROCEDURE — 99214 OFFICE O/P EST MOD 30 MIN: CPT

## 2024-12-18 PROCEDURE — 87661 TRICHOMONAS VAGINALIS AMPLIF: CPT

## 2024-12-18 PROCEDURE — 87591 N.GONORRHOEAE DNA AMP PROB: CPT

## 2024-12-18 PROCEDURE — 87340 HEPATITIS B SURFACE AG IA: CPT

## 2024-12-18 PROCEDURE — 87389 HIV-1 AG W/HIV-1&-2 AB AG IA: CPT

## 2024-12-18 PROCEDURE — 88141 CYTOPATH C/V INTERPRET: CPT

## 2024-12-18 PROCEDURE — 99213 OFFICE O/P EST LOW 20 MIN: CPT

## 2024-12-18 PROCEDURE — 87491 CHLMYD TRACH DNA AMP PROBE: CPT

## 2024-12-18 PROCEDURE — 81513 NFCT DS BV RNA VAG FLU ALG: CPT

## 2024-12-19 LAB
BV BACTERIA RRNA VAG QL NAA+PROBE: SIGNIFICANT CHANGE UP
C GLABRATA RNA VAG QL NAA+PROBE: SIGNIFICANT CHANGE UP
C TRACH RRNA SPEC QL NAA+PROBE: SIGNIFICANT CHANGE UP
CANDIDA RRNA VAG QL PROBE: DETECTED
HBV SURFACE AG SER-ACNC: SIGNIFICANT CHANGE UP
HCV AB S/CO SERPL IA: 0.17 S/CO — SIGNIFICANT CHANGE UP (ref 0–0.99)
HCV AB SERPL-IMP: SIGNIFICANT CHANGE UP
HIV 1+2 AB+HIV1 P24 AG SERPL QL IA: SIGNIFICANT CHANGE UP
HPV HIGH+LOW RISK DNA PNL CVX: SIGNIFICANT CHANGE UP
N GONORRHOEA RRNA SPEC QL NAA+PROBE: SIGNIFICANT CHANGE UP
T PALLIDUM AB TITR SER: NEGATIVE — SIGNIFICANT CHANGE UP
T VAGINALIS RRNA SPEC QL NAA+PROBE: SIGNIFICANT CHANGE UP

## 2024-12-20 PROBLEM — B37.31 CANDIDA VAGINITIS: Status: ACTIVE | Noted: 2024-12-20 | Resolved: 2025-01-19

## 2024-12-20 RX ORDER — TERCONAZOLE 8 MG/G
0.8 CREAM VAGINAL
Qty: 1 | Refills: 0 | Status: ACTIVE | COMMUNITY
Start: 2024-12-20 | End: 1900-01-01

## 2024-12-26 ENCOUNTER — OUTPATIENT (OUTPATIENT)
Dept: OUTPATIENT SERVICES | Facility: HOSPITAL | Age: 39
LOS: 1 days | End: 2024-12-26
Payer: MEDICAID

## 2024-12-26 ENCOUNTER — APPOINTMENT (OUTPATIENT)
Dept: SLEEP CENTER | Facility: CLINIC | Age: 39
End: 2024-12-26

## 2024-12-26 DIAGNOSIS — Z98.890 OTHER SPECIFIED POSTPROCEDURAL STATES: Chronic | ICD-10-CM

## 2024-12-26 DIAGNOSIS — Z98.89 OTHER SPECIFIED POSTPROCEDURAL STATES: Chronic | ICD-10-CM

## 2024-12-26 DIAGNOSIS — Z64.0 PROBLEMS RELATED TO UNWANTED PREGNANCY: Chronic | ICD-10-CM

## 2024-12-26 DIAGNOSIS — E89.0 POSTPROCEDURAL HYPOTHYROIDISM: Chronic | ICD-10-CM

## 2024-12-26 LAB — CYTOLOGY SPEC DOC CYTO: SIGNIFICANT CHANGE UP

## 2024-12-26 PROCEDURE — 95811 POLYSOM 6/>YRS CPAP 4/> PARM: CPT

## 2024-12-26 PROCEDURE — 95811 POLYSOM 6/>YRS CPAP 4/> PARM: CPT | Mod: 26

## 2024-12-27 DIAGNOSIS — G47.33 OBSTRUCTIVE SLEEP APNEA (ADULT) (PEDIATRIC): ICD-10-CM

## 2025-01-13 ENCOUNTER — APPOINTMENT (OUTPATIENT)
Dept: PULMONOLOGY | Facility: CLINIC | Age: 40
End: 2025-01-13
Payer: MEDICAID

## 2025-01-13 PROCEDURE — 99212 OFFICE O/P EST SF 10 MIN: CPT | Mod: 93

## 2025-01-15 ENCOUNTER — APPOINTMENT (OUTPATIENT)
Dept: DERMATOLOGY | Facility: CLINIC | Age: 40
End: 2025-01-15

## 2025-01-24 ENCOUNTER — APPOINTMENT (OUTPATIENT)
Dept: ENDOCRINOLOGY | Facility: CLINIC | Age: 40
End: 2025-01-24
Payer: MEDICAID

## 2025-01-24 VITALS
WEIGHT: 149 LBS | OXYGEN SATURATION: 99 % | DIASTOLIC BLOOD PRESSURE: 86 MMHG | BODY MASS INDEX: 27.42 KG/M2 | HEART RATE: 78 BPM | HEIGHT: 62 IN | SYSTOLIC BLOOD PRESSURE: 110 MMHG

## 2025-01-24 DIAGNOSIS — C73 MALIGNANT NEOPLASM OF THYROID GLAND: ICD-10-CM

## 2025-01-24 PROCEDURE — 99214 OFFICE O/P EST MOD 30 MIN: CPT

## 2025-01-24 PROCEDURE — G2211 COMPLEX E/M VISIT ADD ON: CPT | Mod: NC

## 2025-02-14 ENCOUNTER — RX RENEWAL (OUTPATIENT)
Age: 40
End: 2025-02-14

## 2025-02-21 ENCOUNTER — APPOINTMENT (OUTPATIENT)
Dept: PULMONOLOGY | Facility: CLINIC | Age: 40
End: 2025-02-21
Payer: MEDICAID

## 2025-02-21 VITALS
HEART RATE: 101 BPM | OXYGEN SATURATION: 98 % | SYSTOLIC BLOOD PRESSURE: 122 MMHG | DIASTOLIC BLOOD PRESSURE: 82 MMHG | HEIGHT: 62 IN | TEMPERATURE: 98 F | BODY MASS INDEX: 27.79 KG/M2 | RESPIRATION RATE: 16 BRPM | WEIGHT: 151 LBS

## 2025-02-21 DIAGNOSIS — J84.112 IDIOPATHIC PULMONARY FIBROSIS: ICD-10-CM

## 2025-02-21 DIAGNOSIS — L50.1 IDIOPATHIC URTICARIA: ICD-10-CM

## 2025-02-21 DIAGNOSIS — R06.02 SHORTNESS OF BREATH: ICD-10-CM

## 2025-02-21 DIAGNOSIS — R76.8 OTHER SPECIFIED ABNORMAL IMMUNOLOGICAL FINDINGS IN SERUM: ICD-10-CM

## 2025-02-21 DIAGNOSIS — R93.89 ABNORMAL FINDINGS ON DIAGNOSTIC IMAGING OF OTHER SPECIFIED BODY STRUCTURES: ICD-10-CM

## 2025-02-21 DIAGNOSIS — J30.89 OTHER ALLERGIC RHINITIS: ICD-10-CM

## 2025-02-21 PROCEDURE — ZZZZZ: CPT

## 2025-02-21 PROCEDURE — 94010 BREATHING CAPACITY TEST: CPT

## 2025-02-21 PROCEDURE — 94727 GAS DIL/WSHOT DETER LNG VOL: CPT

## 2025-02-21 PROCEDURE — 94729 DIFFUSING CAPACITY: CPT

## 2025-02-21 PROCEDURE — 99214 OFFICE O/P EST MOD 30 MIN: CPT | Mod: 25

## 2025-02-21 PROCEDURE — 95012 NITRIC OXIDE EXP GAS DETER: CPT

## 2025-03-03 NOTE — HISTORY OF PRESENT ILLNESS
Spiritual Health History and Assessment/Progress Note  [facility]    (P) Spiritual/Emotional Needs,  , (P) Adjustment to illness,      Name: Apurva Francis MRN: [id number]    Age: 70 y.o.     Sex: female   Language: English   Jain: Nondenominational   Neutropenic fever     Date: 3/3/2025            Total Time Calculated: (P) 25 min              Spiritual Assessment continued in [address]        Referral/Consult From: (P) Nurse   Encounter Overview/Reason: (P) Spiritual/Emotional Needs  Service Provided For: (P) Patient    Lela, Belief, Meaning:   Patient identifies as spiritual and has beliefs or practices that help with coping during difficult times  Family/Friends No family/friends present      Importance and Influence:  Patient unable to assess at this time  Family/Friends No family/friends present    Community:  Patient feels well-supported. Support system includes: Children and Friends  Family/Friends No family/friends present    Assessment and Plan of Care:   Patient, whom writer met in the outpatient oncology center, was sitting in bed. Pt shared how she was doing. Pt acknowledged the impact of the treatment on her and how she was feeling about it. Pt affirmed at times feeling discouraged and having some internal conflict about getting treatment. Pt was tearful as she wondered how she could get cancer when she was so healthy. Pt shared that her puppy is her reason for continuing. Pt acknowledged the challenge of having to ask for help. Pt expressed gratitude for her close friends who are there for her. Pt reflected that she would like to believe that her previous spiritual practices and experiences have prepared her for this experience. Pt shared her feelings and thoughts about her late spouse and her ongoing grief. Pt showed photos of her late Spouse and her dogs. Pt's mood lifted as she spoke of her dogs and late spouse. Pt shared experiences she has had that assure her of her late spouse's  [de-identified] : Pt 7 1/2 years s/p thyroidectomy for malignancy feels fatigued on current dose of Synthroid had blood work by Dr Ware to follow up with her about adjusting dose. Pt had sonogram cervical lymph nodes appear benign and stable all reports reviewed presence.    Patient Interventions include: Facilitated expression of thoughts and feelings, Explored spiritual coping/struggle/distress, and Affirmed coping skills/support systems. Affirmed Pt's strengths. Inquired about Pt's coping and needs. Offered grief care. Encouraged Pt to draw strength from her beliefs and practices.   Family/Friends Interventions include: No family/friends present    Patient Plan of Care: Spiritual Care available upon further referral  Family/Friends Plan of Care: No family/friends present    Electronically signed by KENYATTA Luna on 3/3/2025 at 3:59 PM

## 2025-03-25 ENCOUNTER — APPOINTMENT (OUTPATIENT)
Dept: DERMATOLOGY | Facility: CLINIC | Age: 40
End: 2025-03-25
Payer: MEDICAID

## 2025-03-25 DIAGNOSIS — L30.9 DERMATITIS, UNSPECIFIED: ICD-10-CM

## 2025-03-25 DIAGNOSIS — L70.0 ACNE VULGARIS: ICD-10-CM

## 2025-03-25 PROCEDURE — 99214 OFFICE O/P EST MOD 30 MIN: CPT

## 2025-03-25 RX ORDER — TRIAMCINOLONE ACETONIDE 1 MG/G
0.1 OINTMENT TOPICAL
Qty: 1 | Refills: 2 | Status: ACTIVE | COMMUNITY
Start: 2025-03-25 | End: 1900-01-01

## 2025-04-14 ENCOUNTER — NON-APPOINTMENT (OUTPATIENT)
Age: 40
End: 2025-04-14

## 2025-05-13 ENCOUNTER — APPOINTMENT (OUTPATIENT)
Dept: CARDIOLOGY | Facility: CLINIC | Age: 40
End: 2025-05-13

## 2025-05-19 ENCOUNTER — APPOINTMENT (OUTPATIENT)
Dept: PEDIATRIC ALLERGY IMMUNOLOGY | Facility: CLINIC | Age: 40
End: 2025-05-19
Payer: MEDICAID

## 2025-05-19 VITALS — OXYGEN SATURATION: 98 % | WEIGHT: 153.66 LBS | BODY MASS INDEX: 28.1 KG/M2 | HEART RATE: 98 BPM

## 2025-05-19 DIAGNOSIS — R76.8 OTHER SPECIFIED ABNORMAL IMMUNOLOGICAL FINDINGS IN SERUM: ICD-10-CM

## 2025-05-19 DIAGNOSIS — L50.1 IDIOPATHIC URTICARIA: ICD-10-CM

## 2025-05-19 DIAGNOSIS — R05.3 CHRONIC COUGH: ICD-10-CM

## 2025-05-19 DIAGNOSIS — D89.89 OTHER SPECIFIED DISORDERS INVOLVING THE IMMUNE MECHANISM, NOT ELSEWHERE CLASSIFIED: ICD-10-CM

## 2025-05-19 DIAGNOSIS — E88.09 OTHER DISORDERS OF PLASMA-PROTEIN METABOLISM, NOT ELSEWHERE CLASSIFIED: ICD-10-CM

## 2025-05-19 DIAGNOSIS — J30.89 OTHER ALLERGIC RHINITIS: ICD-10-CM

## 2025-05-19 PROCEDURE — 99214 OFFICE O/P EST MOD 30 MIN: CPT | Mod: 25

## 2025-05-19 RX ORDER — NEISSERIA MENINGITIDIS SEROGROUP B NHBA FUSION PROTEIN ANTIGEN, NEISSERIA MENINGITIDIS SEROGROUP B FHBP FUSION PROTEIN ANTIGEN AND NEISSERIA MENINGITIDIS SEROGROUP B NADA PROTEIN ANTIGEN 50; 50; 50; 25 UG/.5ML; UG/.5ML; UG/.5ML; UG/.5ML
0.5 INJECTION, SUSPENSION INTRAMUSCULAR
Qty: 1 | Refills: 0 | Status: ACTIVE | COMMUNITY
Start: 2025-05-19 | End: 1900-01-01

## 2025-05-19 RX ORDER — NEISSERIA MENINGITIDIS GROUP A CAPSULAR POLYSACCHARIDE TETANUS TOXOID CONJUGATE ANTIGEN, NEISSERIA MENINGITIDIS GROUP C CAPSULAR POLYSACCHARIDE TETANUS TOXOID CONJUGATE ANTIGEN, NEISSERIA MENINGITIDIS GROUP Y CAPSULAR POLYSACCHARIDE TETANUS TOXOID CONJUGATE ANTIGEN, AND NEISSERIA MENINGITIDIS GROUP W-135 CAPSULAR POLYSACCHARIDE TETANUS TOXOID CONJUGATE ANTIGEN 10; 10; 10; 10 UG/.5ML; UG/.5ML; UG/.5ML; UG/.5ML
INJECTION, SOLUTION INTRAMUSCULAR
Qty: 1 | Refills: 0 | Status: ACTIVE | COMMUNITY
Start: 2025-05-19 | End: 1900-01-01

## 2025-05-19 RX ORDER — AZELASTINE HYDROCHLORIDE 137 UG/1
0.1 SPRAY, METERED NASAL TWICE DAILY
Qty: 1 | Refills: 3 | Status: ACTIVE | COMMUNITY
Start: 2025-05-19 | End: 1900-01-01

## 2025-05-30 ENCOUNTER — RX RENEWAL (OUTPATIENT)
Age: 40
End: 2025-05-30

## 2025-06-13 ENCOUNTER — APPOINTMENT (OUTPATIENT)
Dept: OTOLARYNGOLOGY | Facility: CLINIC | Age: 40
End: 2025-06-13
Payer: MEDICAID

## 2025-06-13 ENCOUNTER — APPOINTMENT (OUTPATIENT)
Dept: OTOLARYNGOLOGY | Facility: CLINIC | Age: 40
End: 2025-06-13

## 2025-06-13 VITALS — HEIGHT: 62 IN | BODY MASS INDEX: 28.16 KG/M2 | WEIGHT: 153 LBS

## 2025-06-13 PROBLEM — M26.609 TMJ (TEMPOROMANDIBULAR JOINT DISORDER): Status: ACTIVE | Noted: 2025-06-13

## 2025-06-13 PROBLEM — R09.82 PND (POST-NASAL DRIP): Status: ACTIVE | Noted: 2025-06-13

## 2025-06-13 PROCEDURE — 31231 NASAL ENDOSCOPY DX: CPT

## 2025-06-13 PROCEDURE — 99214 OFFICE O/P EST MOD 30 MIN: CPT | Mod: 25

## 2025-06-13 RX ORDER — MOMETASONE FUROATE 100 %
POWDER (GRAM) MISCELLANEOUS
Qty: 1 | Refills: 3 | Status: ACTIVE | COMMUNITY
Start: 2025-06-13 | End: 1900-01-01

## 2025-06-24 ENCOUNTER — APPOINTMENT (OUTPATIENT)
Dept: DERMATOLOGY | Facility: CLINIC | Age: 40
End: 2025-06-24
Payer: COMMERCIAL

## 2025-06-24 PROBLEM — M62.89: Status: ACTIVE | Noted: 2025-06-24

## 2025-06-24 PROCEDURE — 99214 OFFICE O/P EST MOD 30 MIN: CPT

## 2025-07-14 ENCOUNTER — RX RENEWAL (OUTPATIENT)
Age: 40
End: 2025-07-14

## 2025-07-20 ENCOUNTER — OUTPATIENT (OUTPATIENT)
Dept: OUTPATIENT SERVICES | Facility: HOSPITAL | Age: 40
LOS: 1 days | End: 2025-07-20
Payer: MEDICAID

## 2025-07-20 DIAGNOSIS — Z64.0 PROBLEMS RELATED TO UNWANTED PREGNANCY: Chronic | ICD-10-CM

## 2025-07-20 DIAGNOSIS — C73 MALIGNANT NEOPLASM OF THYROID GLAND: ICD-10-CM

## 2025-07-20 DIAGNOSIS — Z98.890 OTHER SPECIFIED POSTPROCEDURAL STATES: Chronic | ICD-10-CM

## 2025-07-20 DIAGNOSIS — Z98.89 OTHER SPECIFIED POSTPROCEDURAL STATES: Chronic | ICD-10-CM

## 2025-07-20 DIAGNOSIS — E89.0 POSTPROCEDURAL HYPOTHYROIDISM: Chronic | ICD-10-CM

## 2025-07-20 DIAGNOSIS — Z00.8 ENCOUNTER FOR OTHER GENERAL EXAMINATION: ICD-10-CM

## 2025-07-20 PROCEDURE — 76536 US EXAM OF HEAD AND NECK: CPT

## 2025-07-24 LAB
ALBUMIN SERPL ELPH-MCNC: 5 G/DL
ALP BLD-CCNC: 42 U/L
ALT SERPL-CCNC: 17 U/L
ANION GAP SERPL CALC-SCNC: 14 MMOL/L
AST SERPL-CCNC: 19 U/L
BILIRUB DIRECT SERPL-MCNC: 0.08 MG/DL
BILIRUB INDIRECT SERPL-MCNC: 0.1 MG/DL
BILIRUB SERPL-MCNC: 0.2 MG/DL
BUN SERPL-MCNC: 16 MG/DL
CALCIUM SERPL-MCNC: 9.9 MG/DL
CHLORIDE SERPL-SCNC: 105 MMOL/L
CHOLEST SERPL-MCNC: 178 MG/DL
CO2 SERPL-SCNC: 21 MMOL/L
CREAT SERPL-MCNC: 0.96 MG/DL
EGFRCR SERPLBLD CKD-EPI 2021: 76 ML/MIN/1.73M2
ESTIMATED AVERAGE GLUCOSE: 114 MG/DL
GLUCOSE SERPL-MCNC: 90 MG/DL
HBA1C MFR BLD HPLC: 5.6 %
HDLC SERPL-MCNC: 41 MG/DL
LDLC SERPL DIRECT ASSAY-MCNC: 112 MG/DL
LDLC SERPL-MCNC: 113 MG/DL
MAGNESIUM SERPL-MCNC: 2.1 MG/DL
NONHDLC SERPL-MCNC: 137 MG/DL
POTASSIUM SERPL-SCNC: 4.5 MMOL/L
PROT SERPL-MCNC: 8.3 G/DL
SODIUM SERPL-SCNC: 140 MMOL/L
THYROGLOB AB SERPL-ACNC: 17.6 IU/ML
THYROGLOB SERPL-MCNC: <0.1 NG/ML
TRIGL SERPL-MCNC: 140 MG/DL
TSH SERPL-ACNC: 3.74 UIU/ML
TSH SERPL-ACNC: 3.82 UIU/ML
URATE SERPL-MCNC: 4.6 MG/DL

## 2025-07-29 ENCOUNTER — APPOINTMENT (OUTPATIENT)
Dept: SURGERY | Facility: CLINIC | Age: 40
End: 2025-07-29
Payer: COMMERCIAL

## 2025-07-29 PROCEDURE — G2211 COMPLEX E/M VISIT ADD ON: CPT

## 2025-07-29 PROCEDURE — 99214 OFFICE O/P EST MOD 30 MIN: CPT

## 2025-07-30 VITALS — BODY MASS INDEX: 28.52 KG/M2 | WEIGHT: 155 LBS | HEIGHT: 62 IN

## 2025-08-01 ENCOUNTER — NON-APPOINTMENT (OUTPATIENT)
Age: 40
End: 2025-08-01

## 2025-08-01 ENCOUNTER — APPOINTMENT (OUTPATIENT)
Dept: ENDOCRINOLOGY | Facility: CLINIC | Age: 40
End: 2025-08-01
Payer: COMMERCIAL

## 2025-08-01 VITALS
WEIGHT: 153 LBS | OXYGEN SATURATION: 98 % | HEIGHT: 62 IN | BODY MASS INDEX: 28.16 KG/M2 | SYSTOLIC BLOOD PRESSURE: 114 MMHG | HEART RATE: 98 BPM | DIASTOLIC BLOOD PRESSURE: 80 MMHG

## 2025-08-01 DIAGNOSIS — C73 MALIGNANT NEOPLASM OF THYROID GLAND: ICD-10-CM

## 2025-08-01 DIAGNOSIS — E03.9 HYPOTHYROIDISM, UNSPECIFIED: ICD-10-CM

## 2025-08-01 DIAGNOSIS — R73.03 PREDIABETES.: ICD-10-CM

## 2025-08-01 PROCEDURE — 99214 OFFICE O/P EST MOD 30 MIN: CPT

## 2025-08-01 PROCEDURE — G2211 COMPLEX E/M VISIT ADD ON: CPT

## 2025-08-15 ENCOUNTER — RX RENEWAL (OUTPATIENT)
Age: 40
End: 2025-08-15

## 2025-09-04 ENCOUNTER — APPOINTMENT (OUTPATIENT)
Dept: CARDIOLOGY | Facility: CLINIC | Age: 40
End: 2025-09-04
Payer: COMMERCIAL

## 2025-09-04 VITALS
HEIGHT: 62 IN | HEART RATE: 64 BPM | TEMPERATURE: 98.2 F | OXYGEN SATURATION: 94 % | SYSTOLIC BLOOD PRESSURE: 125 MMHG | WEIGHT: 152 LBS | BODY MASS INDEX: 27.97 KG/M2 | DIASTOLIC BLOOD PRESSURE: 82 MMHG | RESPIRATION RATE: 16 BRPM

## 2025-09-04 DIAGNOSIS — E78.5 HYPERLIPIDEMIA, UNSPECIFIED: ICD-10-CM

## 2025-09-04 DIAGNOSIS — E78.1 PURE HYPERGLYCERIDEMIA: ICD-10-CM

## 2025-09-04 PROCEDURE — 99213 OFFICE O/P EST LOW 20 MIN: CPT

## 2025-09-04 PROCEDURE — G2211 COMPLEX E/M VISIT ADD ON: CPT

## 2025-09-05 ENCOUNTER — APPOINTMENT (OUTPATIENT)
Dept: CARDIOLOGY | Facility: CLINIC | Age: 40
End: 2025-09-05

## 2025-09-10 ENCOUNTER — APPOINTMENT (OUTPATIENT)
Dept: PEDIATRIC ALLERGY IMMUNOLOGY | Facility: CLINIC | Age: 40
End: 2025-09-10

## 2025-09-10 VITALS
DIASTOLIC BLOOD PRESSURE: 86 MMHG | OXYGEN SATURATION: 98 % | BODY MASS INDEX: 28.55 KG/M2 | SYSTOLIC BLOOD PRESSURE: 126 MMHG | WEIGHT: 155.13 LBS | HEART RATE: 79 BPM | HEIGHT: 62 IN

## 2025-09-10 PROCEDURE — 99213 OFFICE O/P EST LOW 20 MIN: CPT | Mod: 25

## 2025-09-16 ENCOUNTER — APPOINTMENT (OUTPATIENT)
Dept: PULMONOLOGY | Facility: CLINIC | Age: 40
End: 2025-09-16

## 2025-09-24 PROBLEM — Z23 ENCOUNTER FOR IMMUNIZATION: Status: ACTIVE | Noted: 2025-09-24

## (undated) DEVICE — CONN FEMALE LUER ADAPTOR SM XMAS TREE

## (undated) DEVICE — TUBING STRYKER HYSTEROSCOPY INFLOW OUTFLOW

## (undated) DEVICE — DRAPE BACK TABLE COVER 44X90"

## (undated) DEVICE — VENODYNE/SCD SLEEVE CALF MEDIUM

## (undated) DEVICE — GLV 5.5 PROTEXIS (WHITE)

## (undated) DEVICE — DRAPE 3/4 SHEET 52X76"

## (undated) DEVICE — AVETA FLUID MANAGEMENT ACCESSORY W CAP

## (undated) DEVICE — POSITIONER PATIENT SAFETY STRAP 3X60"

## (undated) DEVICE — WARMING BLANKET UPPER ADULT

## (undated) DEVICE — LABELS BLANK W PEN

## (undated) DEVICE — AVETA CORAL HYSTEROSCOPE 4.6MM DISP

## (undated) DEVICE — ELCTR BOVIE SUCTION 10FR

## (undated) DEVICE — AVETA FLUID MANAGEMENT ACCESSORY

## (undated) DEVICE — DRAPE LIGHT HANDLE COVER (GREEN)

## (undated) DEVICE — MEDTRONIC INSTRUMENT TRACKER ENT

## (undated) DEVICE — PACKING GAUZE PLAIN 0.5"

## (undated) DEVICE — MARKING PEN W RULER

## (undated) DEVICE — DRSG MEROCEL 2000 WITH STRING 8CM

## (undated) DEVICE — ELCTR GROUNDING PAD ADULT COVIDIEN

## (undated) DEVICE — BASIN SET DOUBLE

## (undated) DEVICE — SYR LUER LOK 5CC

## (undated) DEVICE — WARMING BLANKET LOWER ADULT

## (undated) DEVICE — Device

## (undated) DEVICE — SOL INJ NS 0.9% 500ML BAG

## (undated) DEVICE — TUBING IRRIGATION STRAIGHT SHOT

## (undated) DEVICE — SOL ANTI FOG

## (undated) DEVICE — URETERAL CATH RED RUBBER 20FR (YELLOW)

## (undated) DEVICE — SOL IRR POUR NS 0.9% 500ML

## (undated) DEVICE — SUT PLAIN GUT 4-0 18" SC-1

## (undated) DEVICE — CATH IV SAFE INSYTE 14G X 1.75" (ORANGE)

## (undated) DEVICE — DRAPE SPLIT SHEET 77" X 108"

## (undated) DEVICE — PACK LITHOTOMY

## (undated) DEVICE — GLV 7.5 PROTEXIS (WHITE)

## (undated) DEVICE — TUBING SUCTION NONCONDUCTIVE 6MM X 12FT

## (undated) DEVICE — TUBING SUCTION CONN 6FT STERILE

## (undated) DEVICE — PACK SMR

## (undated) DEVICE — POSITIONER FOAM EGG CRATE ULNAR 2PCS (PINK)

## (undated) DEVICE — MEDTRONIC AXIEM PATIENT TRACKER NON-INVASIVE

## (undated) DEVICE — SYR LUER LOK 50CC

## (undated) DEVICE — SUT ETHILON 3-0 18" FS-1

## (undated) DEVICE — PREP BETADINE KIT

## (undated) DEVICE — FOLEY HOLDER STATLOCK 2 WAY ADULT

## (undated) DEVICE — SOL IRR BAG NS 0.9% 3000ML

## (undated) DEVICE — SYR LUER LOK 20CC

## (undated) DEVICE — DRAPE 1/2 SHEET 40X57"

## (undated) DEVICE — SPECIMEN CONTAINER 100ML

## (undated) DEVICE — GLV 7 PROTEXIS (WHITE)

## (undated) DEVICE — DRSG SPLINT INTRA NASAL .5MM OVERSIZE THICK

## (undated) DEVICE — SUCTION YANKAUER NO CONTROL VENT

## (undated) DEVICE — ELCTR ROCKER SWITCH PENCIL BLUE 10FT

## (undated) DEVICE — SOL IRR GLYCINE 1.5% 3000L

## (undated) DEVICE — BLADE MEDTRONIC ENT FUSION TRICUT ROTATABLE STRAIGHT 4MM X 13CM

## (undated) DEVICE — SUT CHROMIC 4-0 18" G-2

## (undated) DEVICE — DRAPE TOWEL BLUE 17" X 24"

## (undated) DEVICE — DRSG TELFA 3 X 8

## (undated) DEVICE — SOL IRR POUR H2O 500ML